# Patient Record
Sex: MALE | Race: WHITE | Employment: FULL TIME | URBAN - METROPOLITAN AREA
[De-identification: names, ages, dates, MRNs, and addresses within clinical notes are randomized per-mention and may not be internally consistent; named-entity substitution may affect disease eponyms.]

---

## 2024-10-31 ENCOUNTER — HOSPITAL ENCOUNTER (INPATIENT)
Age: 69
LOS: 13 days | Discharge: HOME | DRG: 057 | End: 2024-11-13
Attending: PHYSICAL MEDICINE & REHABILITATION | Admitting: PHYSICAL MEDICINE & REHABILITATION
Payer: MEDICARE

## 2024-10-31 PROBLEM — I63.9 ACUTE ISCHEMIC STROKE (HCC): Status: ACTIVE | Noted: 2024-10-31

## 2024-10-31 LAB
GLUCOSE BLD-MCNC: 121 MG/DL (ref 70–99)
PERFORMED ON: ABNORMAL

## 2024-10-31 PROCEDURE — 1280000000 HC REHAB R&B

## 2024-10-31 RX ORDER — ACETAMINOPHEN 325 MG/1
650 TABLET ORAL EVERY 6 HOURS PRN
Status: DISCONTINUED | OUTPATIENT
Start: 2024-10-31 | End: 2024-11-13 | Stop reason: HOSPADM

## 2024-10-31 RX ORDER — BISACODYL 10 MG
10 SUPPOSITORY, RECTAL RECTAL DAILY PRN
Status: DISCONTINUED | OUTPATIENT
Start: 2024-10-31 | End: 2024-11-13 | Stop reason: HOSPADM

## 2024-10-31 RX ORDER — BUDESONIDE AND FORMOTEROL FUMARATE DIHYDRATE 160; 4.5 UG/1; UG/1
2 AEROSOL RESPIRATORY (INHALATION)
Status: DISCONTINUED | OUTPATIENT
Start: 2024-10-31 | End: 2024-11-13 | Stop reason: HOSPADM

## 2024-10-31 RX ORDER — METFORMIN HYDROCHLORIDE 500 MG/1
500 TABLET, EXTENDED RELEASE ORAL
Status: DISCONTINUED | OUTPATIENT
Start: 2024-11-01 | End: 2024-11-13 | Stop reason: HOSPADM

## 2024-10-31 RX ORDER — ASPIRIN 81 MG/1
81 TABLET, CHEWABLE ORAL DAILY
Status: DISCONTINUED | OUTPATIENT
Start: 2024-11-01 | End: 2024-11-13 | Stop reason: HOSPADM

## 2024-10-31 RX ORDER — LANOLIN ALCOHOL/MO/W.PET/CERES
400 CREAM (GRAM) TOPICAL 2 TIMES DAILY
Status: DISCONTINUED | OUTPATIENT
Start: 2024-10-31 | End: 2024-11-13 | Stop reason: HOSPADM

## 2024-10-31 RX ORDER — CLOPIDOGREL BISULFATE 75 MG/1
75 TABLET ORAL DAILY
Status: DISCONTINUED | OUTPATIENT
Start: 2024-11-01 | End: 2024-11-13 | Stop reason: HOSPADM

## 2024-10-31 RX ORDER — ONDANSETRON 4 MG/1
4 TABLET, FILM COATED ORAL EVERY 8 HOURS PRN
Status: DISCONTINUED | OUTPATIENT
Start: 2024-10-31 | End: 2024-11-13 | Stop reason: HOSPADM

## 2024-10-31 RX ORDER — LOSARTAN POTASSIUM AND HYDROCHLOROTHIAZIDE 12.5; 5 MG/1; MG/1
2 TABLET ORAL DAILY
Status: DISCONTINUED | OUTPATIENT
Start: 2024-11-01 | End: 2024-10-31 | Stop reason: CLARIF

## 2024-10-31 RX ORDER — SENNA AND DOCUSATE SODIUM 50; 8.6 MG/1; MG/1
1 TABLET, FILM COATED ORAL 2 TIMES DAILY
Status: DISCONTINUED | OUTPATIENT
Start: 2024-10-31 | End: 2024-11-08

## 2024-10-31 RX ORDER — ATORVASTATIN CALCIUM 10 MG/1
10 TABLET, FILM COATED ORAL DAILY
Status: DISCONTINUED | OUTPATIENT
Start: 2024-11-01 | End: 2024-11-13 | Stop reason: HOSPADM

## 2024-10-31 RX ORDER — GLUCAGON 1 MG/ML
1 KIT INJECTION PRN
Status: DISCONTINUED | OUTPATIENT
Start: 2024-10-31 | End: 2024-11-13 | Stop reason: HOSPADM

## 2024-10-31 RX ORDER — ENOXAPARIN SODIUM 100 MG/ML
40 INJECTION SUBCUTANEOUS DAILY
Status: DISCONTINUED | OUTPATIENT
Start: 2024-11-01 | End: 2024-11-13 | Stop reason: HOSPADM

## 2024-10-31 RX ORDER — DEXTROSE MONOHYDRATE 100 MG/ML
INJECTION, SOLUTION INTRAVENOUS CONTINUOUS PRN
Status: DISCONTINUED | OUTPATIENT
Start: 2024-10-31 | End: 2024-11-13 | Stop reason: HOSPADM

## 2024-10-31 RX ORDER — LANOLIN ALCOHOL/MO/W.PET/CERES
3 CREAM (GRAM) TOPICAL NIGHTLY PRN
Status: DISCONTINUED | OUTPATIENT
Start: 2024-10-31 | End: 2024-11-13 | Stop reason: HOSPADM

## 2024-10-31 RX ORDER — INSULIN LISPRO 100 [IU]/ML
0-4 INJECTION, SOLUTION INTRAVENOUS; SUBCUTANEOUS
Status: DISCONTINUED | OUTPATIENT
Start: 2024-10-31 | End: 2024-11-08

## 2024-10-31 RX ORDER — HYDRALAZINE HYDROCHLORIDE 10 MG/1
10 TABLET, FILM COATED ORAL EVERY 6 HOURS PRN
Status: DISCONTINUED | OUTPATIENT
Start: 2024-10-31 | End: 2024-11-13 | Stop reason: HOSPADM

## 2024-10-31 RX ORDER — PANTOPRAZOLE SODIUM 40 MG/1
40 TABLET, DELAYED RELEASE ORAL
Status: DISCONTINUED | OUTPATIENT
Start: 2024-11-01 | End: 2024-11-13 | Stop reason: HOSPADM

## 2024-10-31 RX ORDER — LOSARTAN POTASSIUM 100 MG/1
100 TABLET ORAL DAILY
Status: DISCONTINUED | OUTPATIENT
Start: 2024-11-01 | End: 2024-11-13 | Stop reason: HOSPADM

## 2024-10-31 RX ORDER — HYDROCHLOROTHIAZIDE 25 MG/1
25 TABLET ORAL DAILY
Status: DISCONTINUED | OUTPATIENT
Start: 2024-11-01 | End: 2024-11-13 | Stop reason: HOSPADM

## 2024-10-31 RX ORDER — ALBUTEROL SULFATE 90 UG/1
2 INHALANT RESPIRATORY (INHALATION) EVERY 6 HOURS PRN
Status: DISCONTINUED | OUTPATIENT
Start: 2024-10-31 | End: 2024-11-01

## 2024-10-31 RX ORDER — POLYETHYLENE GLYCOL 3350 17 G/17G
17 POWDER, FOR SOLUTION ORAL DAILY PRN
Status: DISCONTINUED | OUTPATIENT
Start: 2024-10-31 | End: 2024-11-13 | Stop reason: HOSPADM

## 2024-10-31 ASSESSMENT — LIFESTYLE VARIABLES
HOW OFTEN DO YOU HAVE A DRINK CONTAINING ALCOHOL: 2-3 TIMES A WEEK
HOW MANY STANDARD DRINKS CONTAINING ALCOHOL DO YOU HAVE ON A TYPICAL DAY: 1 OR 2

## 2024-10-31 NOTE — PROGRESS NOTES
External Admission Medical Chart Summary For :  Sean Jackson  (All information taken from discharging hospital medical chart)    HPI:  69 yr old male with PMH of DM, HTN, HLD, GERD, COPD who presented to the ER on 10/28 with left sided weakness. He had woken up that morning and was unable to lift his arm and leg. He is unsure if his speech is slurred.  He is unable to get tPA as he was out of the window. The stroke team was consulted and recommendations were made. He is functioning below his baseline and is interested in coming to ARU to increase his independence prior to discharging home.     Physician A&P at discharge from previous hospital:  ASSESSMENT      Principal Problem:    Acute ischemic stroke (HCC)  Active Problems:    Essential hypertension    Diabetes mellitus, type 2 (HCC)    COPD (chronic obstructive pulmonary disease) (HCC)    Hyperlipidemia    Cigar smoker    Left arm weakness    Left leg weakness           PLAN      Suspected CVA  -NIHSS 5 [drift in LUE &LLE, mild dysarthria, and ataxia in 2 limbs]  -ASA/Plavix, statin  -permissive HTN  -telemetry, Echo  -brain MRI  w small acute infarct in the right thalamus   -PT/OT/ST  -STANSE consult appreciated. Dr. Salazar recommended DAPT with ASA & Plavix for NIHSS 3.   -stop smoking     HTN: permissive HTN     HLD: statin     DMT2: LSSI     Tobacco use: reports motivated to stop     Acute Hypoxic Respiratory Failure  COPD: albuterol prn, recommends PFTs outpatient, contiue BD, wean O2 as able     DVT prophylaxis: Intermediate to High risk on  Lovenox SC     Full Code    Diet at discharge from previous hospital:  Cardiac    Anticoagulation at discharge from previous hospital:  Lovenox    Code Status from previous hospital:  Full

## 2024-11-01 LAB
ANION GAP SERPL CALCULATED.3IONS-SCNC: 10 MMOL/L (ref 3–16)
BASOPHILS # BLD: 0 K/UL (ref 0–0.2)
BASOPHILS NFR BLD: 0.5 %
BUN SERPL-MCNC: 15 MG/DL (ref 7–20)
CALCIUM SERPL-MCNC: 9 MG/DL (ref 8.3–10.6)
CHLORIDE SERPL-SCNC: 96 MMOL/L (ref 99–110)
CO2 SERPL-SCNC: 27 MMOL/L (ref 21–32)
CREAT SERPL-MCNC: 0.7 MG/DL (ref 0.8–1.3)
DEPRECATED RDW RBC AUTO: 14.3 % (ref 12.4–15.4)
EOSINOPHIL # BLD: 0.5 K/UL (ref 0–0.6)
EOSINOPHIL NFR BLD: 7.1 %
GFR SERPLBLD CREATININE-BSD FMLA CKD-EPI: >90 ML/MIN/{1.73_M2}
GLUCOSE BLD-MCNC: 114 MG/DL (ref 70–99)
GLUCOSE BLD-MCNC: 117 MG/DL (ref 70–99)
GLUCOSE BLD-MCNC: 125 MG/DL (ref 70–99)
GLUCOSE BLD-MCNC: 134 MG/DL (ref 70–99)
GLUCOSE SERPL-MCNC: 113 MG/DL (ref 70–99)
HCT VFR BLD AUTO: 44 % (ref 40.5–52.5)
HGB BLD-MCNC: 14.9 G/DL (ref 13.5–17.5)
LYMPHOCYTES # BLD: 1.8 K/UL (ref 1–5.1)
LYMPHOCYTES NFR BLD: 26 %
MCH RBC QN AUTO: 27.1 PG (ref 26–34)
MCHC RBC AUTO-ENTMCNC: 33.8 G/DL (ref 31–36)
MCV RBC AUTO: 80.2 FL (ref 80–100)
MONOCYTES # BLD: 0.9 K/UL (ref 0–1.3)
MONOCYTES NFR BLD: 12.8 %
NEUTROPHILS # BLD: 3.6 K/UL (ref 1.7–7.7)
NEUTROPHILS NFR BLD: 53.6 %
PERFORMED ON: ABNORMAL
PLATELET # BLD AUTO: 214 K/UL (ref 135–450)
PMV BLD AUTO: 8.1 FL (ref 5–10.5)
POTASSIUM SERPL-SCNC: 3.8 MMOL/L (ref 3.5–5.1)
PREALB SERPL-MCNC: 17.2 MG/DL (ref 20–40)
RBC # BLD AUTO: 5.48 M/UL (ref 4.2–5.9)
SODIUM SERPL-SCNC: 133 MMOL/L (ref 136–145)
WBC # BLD AUTO: 6.8 K/UL (ref 4–11)

## 2024-11-01 PROCEDURE — 85025 COMPLETE CBC W/AUTO DIFF WBC: CPT

## 2024-11-01 PROCEDURE — 6360000002 HC RX W HCPCS: Performed by: PHYSICAL MEDICINE & REHABILITATION

## 2024-11-01 PROCEDURE — 36415 COLL VENOUS BLD VENIPUNCTURE: CPT

## 2024-11-01 PROCEDURE — 97530 THERAPEUTIC ACTIVITIES: CPT

## 2024-11-01 PROCEDURE — 92523 SPEECH SOUND LANG COMPREHEN: CPT

## 2024-11-01 PROCEDURE — 97166 OT EVAL MOD COMPLEX 45 MIN: CPT

## 2024-11-01 PROCEDURE — 97530 THERAPEUTIC ACTIVITIES: CPT | Performed by: PHYSICAL THERAPIST

## 2024-11-01 PROCEDURE — 94640 AIRWAY INHALATION TREATMENT: CPT

## 2024-11-01 PROCEDURE — 97162 PT EVAL MOD COMPLEX 30 MIN: CPT | Performed by: PHYSICAL THERAPIST

## 2024-11-01 PROCEDURE — 2500000003 HC RX 250 WO HCPCS: Performed by: PHYSICAL MEDICINE & REHABILITATION

## 2024-11-01 PROCEDURE — 97110 THERAPEUTIC EXERCISES: CPT | Performed by: PHYSICAL THERAPIST

## 2024-11-01 PROCEDURE — 97116 GAIT TRAINING THERAPY: CPT | Performed by: PHYSICAL THERAPIST

## 2024-11-01 PROCEDURE — 6370000000 HC RX 637 (ALT 250 FOR IP): Performed by: PHYSICAL MEDICINE & REHABILITATION

## 2024-11-01 PROCEDURE — 84134 ASSAY OF PREALBUMIN: CPT

## 2024-11-01 PROCEDURE — 94760 N-INVAS EAR/PLS OXIMETRY 1: CPT

## 2024-11-01 PROCEDURE — 1280000000 HC REHAB R&B

## 2024-11-01 PROCEDURE — 80048 BASIC METABOLIC PNL TOTAL CA: CPT

## 2024-11-01 PROCEDURE — 97535 SELF CARE MNGMENT TRAINING: CPT

## 2024-11-01 RX ORDER — FENOFIBRATE 160 MG/1
160 TABLET ORAL DAILY
COMMUNITY
Start: 2024-05-24

## 2024-11-01 RX ORDER — ALBUTEROL SULFATE 90 UG/1
2 INHALANT RESPIRATORY (INHALATION) EVERY 4 HOURS PRN
Status: DISCONTINUED | OUTPATIENT
Start: 2024-11-01 | End: 2024-11-13 | Stop reason: HOSPADM

## 2024-11-01 RX ORDER — ATORVASTATIN CALCIUM 80 MG/1
80 TABLET, FILM COATED ORAL DAILY
COMMUNITY
Start: 2024-10-31

## 2024-11-01 RX ORDER — LOSARTAN POTASSIUM AND HYDROCHLOROTHIAZIDE 25; 100 MG/1; MG/1
1 TABLET ORAL DAILY
COMMUNITY
Start: 2024-05-24

## 2024-11-01 RX ORDER — ALBUTEROL SULFATE 90 UG/1
1 INHALANT RESPIRATORY (INHALATION) EVERY 4 HOURS PRN
COMMUNITY

## 2024-11-01 RX ORDER — METFORMIN HYDROCHLORIDE 500 MG/1
500 TABLET, EXTENDED RELEASE ORAL
COMMUNITY

## 2024-11-01 RX ORDER — CLOPIDOGREL BISULFATE 75 MG/1
75 TABLET ORAL DAILY
Status: ON HOLD | COMMUNITY
Start: 2024-11-01 | End: 2024-11-12

## 2024-11-01 RX ORDER — ASPIRIN 81 MG/1
81 TABLET ORAL DAILY
COMMUNITY
Start: 2024-10-30

## 2024-11-01 RX ADMIN — PANTOPRAZOLE SODIUM 40 MG: 40 TABLET, DELAYED RELEASE ORAL at 06:52

## 2024-11-01 RX ADMIN — LOSARTAN POTASSIUM 100 MG: 100 TABLET, FILM COATED ORAL at 08:18

## 2024-11-01 RX ADMIN — ANTI-FUNGAL POWDER MICONAZOLE NITRATE TALC FREE: 1.42 POWDER TOPICAL at 14:47

## 2024-11-01 RX ADMIN — CLOPIDOGREL BISULFATE 75 MG: 75 TABLET ORAL at 08:18

## 2024-11-01 RX ADMIN — ENOXAPARIN SODIUM 40 MG: 100 INJECTION SUBCUTANEOUS at 08:16

## 2024-11-01 RX ADMIN — BUDESONIDE AND FORMOTEROL FUMARATE DIHYDRATE 2 PUFF: 160; 4.5 AEROSOL RESPIRATORY (INHALATION) at 09:03

## 2024-11-01 RX ADMIN — ALBUTEROL SULFATE 2 PUFF: 90 AEROSOL, METERED RESPIRATORY (INHALATION) at 20:18

## 2024-11-01 RX ADMIN — ATORVASTATIN CALCIUM 10 MG: 10 TABLET, FILM COATED ORAL at 08:18

## 2024-11-01 RX ADMIN — ANTI-FUNGAL POWDER MICONAZOLE NITRATE TALC FREE: 1.42 POWDER TOPICAL at 21:41

## 2024-11-01 RX ADMIN — HYDROCHLOROTHIAZIDE 25 MG: 25 TABLET ORAL at 08:18

## 2024-11-01 RX ADMIN — Medication 400 MG: at 08:18

## 2024-11-01 RX ADMIN — ASPIRIN 81 MG 81 MG: 81 TABLET ORAL at 08:18

## 2024-11-01 RX ADMIN — METFORMIN HYDROCHLORIDE 500 MG: 500 TABLET, EXTENDED RELEASE ORAL at 08:18

## 2024-11-01 RX ADMIN — BUDESONIDE AND FORMOTEROL FUMARATE DIHYDRATE 2 PUFF: 160; 4.5 AEROSOL RESPIRATORY (INHALATION) at 20:17

## 2024-11-01 RX ADMIN — Medication 400 MG: at 21:41

## 2024-11-01 ASSESSMENT — PAIN SCALES - GENERAL
PAINLEVEL_OUTOF10: 0

## 2024-11-01 NOTE — H&P
to  physical, occupational, respiratory, and speech, nutritional services, wound care,   and prosthetics and orthotics. Given the patients complex condition  and risk of further medical complications, rehabilitation services cannot be  safely provided at a lower level of care such as a skilled nursing facility.  I have compared the patients medical and functional status at the time of the  preadmission screening and the same on this date, and there are no significant   changes except as documented below in the assessment and plan.    By signing this document, I acknowledge that I have personally performed a  full physical examination on this patient within 24 hours of admission to  this inpatient rehabilitation facility and have determined the patient to be  able to tolerate the above course of treatment at an intensive level for a  reasonable period of time. I will be completing a detailed individualized  Plan of Care for this patient by day four of the patients stay based upon the  Preadmission Screen, this Post-Admission Evaluation, and the therapy  evaluations.       Rehabilitation Diagnosis:   IGC: Right thalamic infarction      Assessment and Plan:    Right thalamic infarction- ASA, Plavix    HTN  -Cozaar, HCTZ    Diabetes type 2 - SSI, metformin    GERD - Protonix    COPD, smoker- Symbicort, albuterol    HLD - Lipitor    CODE: Full Code  Diet: ADULT DIET; Regular; Low Fat/Low Chol/High Fiber/ALVARO  Bowels: Per protocol  Bladder: Per protocol   Pain: Tylenol  DVT PPx: Lovenox  ELOS: TBD      Celso Martinez MD 11/1/2024, 7:34 AM     * This document was created using dictation software.  While all precautions were taken to ensure accuracy, errors may have occurred.  Please disregard any typographical errors.

## 2024-11-01 NOTE — PROGRESS NOTES
Patient admitted to rehab with CVA.  A/Ox4, insight can vary related to limitations, reported not going to call ambulance after having signs of stroke, reviewed importance of calling 911. Transfers with walker and cues. On regular, low fat diet, reviewed diabetic needs, tolerating well. Medications taken whole. On Lovenox for DVT prophylaxis.  Skin: kathryn area excoriated, reports psoriasis for years, but does not treat, skin tear gluteal slit. Has been continent of bladder. LBM 10/31/2024. Chair/bed alarms in use and call light in reach. Will monitor for safety.

## 2024-11-01 NOTE — PROGRESS NOTES
Resting quietly in bed, respirations even/easy. Patient up most of the night, falling asleep around 330am. Patient admitted with a CVA, left sided weakness. Transferred with a stedy x1 last evening. Patient with some impulsive movements and poor safety awareness at times. Lungs diminished with some expiratory wheezes. HR regular. Abdomen non tender with active bowel sounds. Has been continent of urine so far this shift. Unsure of last BM date. Patient with c/o LLE spasms, denied need for tylenol but requesting compression boots for comfort. Spoke with patient about possible need for muscle relaxant, will leave note for MD. Encouraged to call for all needs, call light remains in reach at all times. Bed alarm active. Marcela Mcmillan RN

## 2024-11-01 NOTE — PLAN OF CARE
ARU PATIENT TREATMENT PLAN  [facility]   [address] [place]  [address]  [phone]    Sean Jackson    : 1955  [facility] #: [id number]  MRN: [id number]   PHYSICIAN:  Beryl Greenberg MD  Primary Problem    Patient Active Problem List   Diagnosis    Acute ischemic stroke (HCC)       Rehabilitation Diagnosis:     Acute ischemic stroke (HCC) [I63.9]       ADMIT DATE:10/31/2024    Patient Goals: to return home    Admitting Impairments: Right thalamic infarction- ASA, Plavix     HTN  -Cozaar, HCTZ     Diabetes type 2 - SSI, metformin     GERD - Protonix     COPD, smoker- Symbicort, albuterol     HLD - Lipitor  Barriers: Left hemiparesis, ataxia   Participation: good     CARE PLAN     NURSING:  Sean Jackson while on this unit will:     [] Be continent of bowel and bladder     [x] Have an adequate number of bowel movements  [x] Urinate with no urinary retention >300ml in bladder  [] Complete bladder protocol with mccarthy removal  [x] Maintain O2 SATs at 92%  [x] Have pain managed while on ARU       [] Be pain free by discharge   [x] Have no skin breakdown while on ARU  [x] Have improved skin integrity via wound measurements  [x] Have no signs/symptoms of infection at the wound site  [x] Be free from injury during hospitalization   [x] Complete education with patient/family with understanding demonstrated for:Acute Ischemic Stroke  [x] Adjustment   [x] Other:HTN, DM2   Nursing interventions may include bowel/bladder training, education for medical assistive devices, medication education, O2 saturation management, energy conservation, stress management techniques, fall prevention, alarms protocol, seating and positioning, skin/wound care, pressure relief instruction,dressing changes,  infection protection, DVT prophylaxis, and/or assistance with in room safety with transfers to bed, toilet, wheelchair, shower as well as bathroom activities and hygiene.

## 2024-11-01 NOTE — PROGRESS NOTES
Patient arrived shortly before 2200, no report called. Evening nurse RN attempted to call [facility] for report, sat on hold for 10 minutes. Attempted call again after 15 minutes and nurse states she was unable to get into the patients discharge chart to give thorough report. LETITIA used for information on transferring and previous orders. Marcela Mcmillan, RN

## 2024-11-01 NOTE — PLAN OF CARE
Problem: Skin/Tissue Integrity  Goal: Absence of new skin breakdown  Description: 1.  Monitor for areas of redness and/or skin breakdown  2.  Assess vascular access sites hourly  3.  Every 4-6 hours minimum:  Change oxygen saturation probe site  4.  Every 4-6 hours:  If on nasal continuous positive airway pressure, respiratory therapy assess nares and determine need for appliance change or resting period.  Outcome: Progressing  Note: Able to change positions in bed without assist, no evidence of skin breakdown noted. Waffle cushion in place to chair.      Problem: Safety - Adult  Goal: Free from fall injury  Outcome: Progressing  Flowsheets (Taken 11/1/2024 0259)  Free From Fall Injury:   Instruct family/caregiver on patient safety   Based on caregiver fall risk screen, instruct family/caregiver to ask for assistance with transferring infant if caregiver noted to have fall risk factors  Note: Pt educated on falls precautions and safety. Call light and personal belongings within reach at all times. Non skid socks in use when up. Hourly rounding and alarms active.

## 2024-11-01 NOTE — PROGRESS NOTES
Comprehensive Nutrition Assessment    Type and Reason for Visit:  Consult    Nutrition Recommendations/Plan:   Continue current diet.     Malnutrition Assessment:  Malnutrition Status:  No malnutrition (11/01/24 0707)    Context:  Acute Illness       Nutrition Assessment:    RD consult for IP rehab. Pt. admitted for acute ischemic stroke. Currently receiving a cardiac diet. Diet acceptance is okay. Pt. reports good tolerance to the foods. No recent Hx. of weight loss or appetite changes. He reports normal bowel function. No questions or concerns at the time of visit. No new recommendations. Will continue to monitor nutritional adequacy and diet acceptance.    Nutrition Related Findings:    Na 133, Cr 0.7. LBM 10/31.         Current Nutrition Intake & Therapies:    Average Meal Intake: Unable to assess  Average Supplements Intake: None Ordered  ADULT DIET; Regular; Low Fat/Low Chol/High Fiber/ALVARO    Anthropometric Measures:  Height: 167.6 cm (5' 5.98\")  Ideal Body Weight (IBW): 142 lbs (65 kg)       Current Body Weight: 86.2 kg (190 lb 0.6 oz), 133.8 % IBW.    Current BMI (kg/m2): 30.7                             BMI Categories: Obese Class 1 (BMI 30.0-34.9)    Estimated Daily Nutrient Needs:  Energy Requirements Based On: Kcal/kg  Weight Used for Energy Requirements: Current  Energy (kcal/day): 6997-4503 kcal (18-23 kcal/kg)  Weight Used for Protein Requirements: Current  Protein (g/day):  g (1-1.2g/kg)  Method Used for Fluid Requirements: 1 ml/kcal  Fluid (ml/day): Or per provider    Nutrition Diagnosis:   Increased nutrient needs related to increase demand for energy/nutrients as evidenced by rehab for strength and conditioning    Nutrition Interventions:   Food and/or Nutrient Delivery: Continue Current Diet  Nutrition Education/Counseling:  (Monitor need)  Coordination of Nutrition Care: Continue to monitor while inpatient       Goals:  Goals: PO intake 50% or greater  Type of Goal: New goal  Previous Goal

## 2024-11-01 NOTE — PLAN OF CARE
Problem: Discharge Planning  Goal: Discharge to home or other facility with appropriate resources  11/1/2024 1023 by Magnolia Adhikari, RN  Outcome: Progressing  Flowsheets (Taken 11/1/2024 0811)  Discharge to home or other facility with appropriate resources:   Identify barriers to discharge with patient and caregiver   Arrange for needed discharge resources and transportation as appropriate   Identify discharge learning needs (meds, wound care, etc)   Refer to discharge planning if patient needs post-hospital services based on physician order or complex needs related to functional status, cognitive ability or social support system  11/1/2024 0259 by Marcela Mcmillan, RN  Outcome: Progressing     Problem: Chronic Conditions and Co-morbidities  Goal: Patient's chronic conditions and co-morbidity symptoms are monitored and maintained or improved  11/1/2024 1023 by Magnolia Adhikari, RN  Outcome: Progressing  Flowsheets (Taken 11/1/2024 0811)  Care Plan - Patient's Chronic Conditions and Co-Morbidity Symptoms are Monitored and Maintained or Improved:   Monitor and assess patient's chronic conditions and comorbid symptoms for stability, deterioration, or improvement   Collaborate with multidisciplinary team to address chronic and comorbid conditions and prevent exacerbation or deterioration   Update acute care plan with appropriate goals if chronic or comorbid symptoms are exacerbated and prevent overall improvement and discharge  11/1/2024 0259 by Marcela Mcmillan, RN  Outcome: Progressing     Problem: Skin/Tissue Integrity  Goal: Absence of new skin breakdown  Description: 1.  Monitor for areas of redness and/or skin breakdown  2.  Assess vascular access sites hourly  3.  Every 4-6 hours minimum:  Change oxygen saturation probe site  4.  Every 4-6 hours:  If on nasal continuous positive airway pressure, respiratory therapy assess nares and determine need for appliance change or resting period.  11/1/2024  on assessed needs   Provide specific nutrition education to patient or family as appropriate     Problem: ABCDS Injury Assessment  Goal: Absence of physical injury  Outcome: Progressing

## 2024-11-01 NOTE — PROGRESS NOTES
Ethnicity  \"Are you of , /a, or Qatari origin?\"  Check all that apply:  [x] A.  No, not of , /a, or Qatari Origin  [] B.  Yes, Polish, Polish American, Chicano/a  [] C.  Yes, British  [] D.  Yes, Brennan  [] E.  Yes, another , , or Qatari origin  [] X.  Patient unable to respond    Race  \"What is your race?\"  Check all that apply:  [x] A.  White  [] B.  Black or   [] C.   or   [] D.     [] E.  Chinese  [] F.  Mexican  [] G. Omani  [] H.  Tajik  [] I.  Spanish  [] J.  Other   [] K.    [] L.  Welsh or Kevin  [] M.  Micronesian  [] N.  Other   [] X.  Patient unable to respond    High Risk Drug Classes:  Use and Indication    Is taking: Check if the pt is taking any medications by pharmacological classification, not how it is used, in the following classes  Indication noted: If column 1 is checked, check if there is an indication noted for all meds in the drug class Is taking  (check all that apply) Indication noted (check all that apply)   Antipsychotic [] []   Anticoagulant [] []   Antibiotic [] []   Opioid [] []   Antiplatelet [x] [x]   Hypoglycemic (including insulin) [] []   None of the above []     Special Treatments, Procedures, and Programs    Check all of the following treatments, procedures, and programs that apply on admission. On admission (check all that apply)   Cancer Treatments   A1. Chemotherapy []           A2. IV []           A3. Oral []           A10. Other []   B1. Radiation []   Respiratory Therapies   C1. Oxygen Therapy []           C2. Continuous []           C3. Intermittent []           C4. High-concentration []   D1. Suctioning []           D2. Scheduled []           D3. As needed []   E1. Tracheostomy Care []   F1. Invasive Mechanical Ventilator (ventilator or respirator) []   G1. Non-invasive Mechanical Ventilator []           G2. BiPAP []            G3. CPAP []   Other   H1. IV Medications []           H2. Vasoactive medications []           H3. Antibiotics []           H4. Anticoagulation []           H10. Other []   I1. Transfusions []   J1. Dialysis []           J2. Hemodialysis []           J3. Peritoneal dialysis []   O1. IV access []           O2. Peripheral []           O3. Midline []           O4. Central (PICC, tunneled, port) []      None of the above (select if no Cancer, Respiratory, or Other boxes are checked) [x]

## 2024-11-01 NOTE — PROGRESS NOTES
4 Eyes Skin Assessment     NAME:  Sean Jackson  YOB: 1955  MEDICAL RECORD NUMBER:  [id number]    The patient is being assessed for  Admission    I agree that at least one RN has performed a thorough Head to Toe Skin Assessment on the patient. ALL assessment sites listed below have been assessed.      Areas assessed by both nurses:    Head, Face, Ears, Shoulders, Back, Chest, Arms, Elbows, Hands, Sacrum. Buttock, Coccyx, Ischium, Legs. Feet and Heels, and Under Medical Devices         Does the Patient have a Wound? No noted wound(s)       David Prevention initiated by RN: Yes  Wound Care Orders initiated by RN: No    Pressure Injury (Stage 3,4, Unstageable, DTI, NWPT, and Complex wounds) if present, place Wound referral order by RN under : No    New Ostomies, if present place, Ostomy referral order under : No     Nurse 1 eSignature: Electronically signed by Marcela Mcmillan RN on 10/31/24 at 11:45 PM EDT    **SHARE this note so that the co-signing nurse can place an eSignature**  11/01/2024 0630  Nurse 2 eSignature: Electronically signed by Magnolia Adhikari RN on

## 2024-11-01 NOTE — PROGRESS NOTES
Facility/Department: [address] IP REHAB  Initial Speech/Language/Cognitive Assessment    NAME: Sean Jackson  : 1955   MRN: [id number]  ADMISSION DATE: 10/31/2024  ADMITTING DIAGNOSIS: has Acute ischemic stroke (HCC) on their problem list.   has a past medical history of Asthma, GERD (gastroesophageal reflux disease), Hyperlipidemia, and Hypertension.   has a past surgical history that includes eye muscle surgery; Wrist surgery; and Appendectomy.  Allergies: Horse- derived products    DATE ONSET:     Date of Eval: 2024   Evaluating Therapist: ROMELIA Benjamin      Chart Reviewed:  Chief Complaint: Right thalamic infarction   History of Present Illness/Hospital Course:  Patient is a 69-year-old male who developed a sudden onset of left arm and leg weakness, ataxia, and dysarthria.  Patient was brought to the hospital where MRI scan of the brain revealed a small acute infarction of the right thalamus.  Patient known to have history of hypertension, diabetes type 2, COPD, and increased lipids.  Patient seen by neurology and started on aspirin and Plavix.  Patient started in therapies, and needs min assist for transfers and gait of 40 feet due to his weakness and ataxia.  Patient needs min to mod assist for lower body bathing and dressing activities.  Patient lives with a roommate in a mobile home and was independent prior to admission.  He states his son and daughter-in-law lives next-door.    RECENT RESULTS  MRI Hed: 10/29/2024  Impression  Small acute infarct in the right thalamus/thalamocapsular region.  No evidence of hemorrhagic transformation or significant mass effect.    MRI Angio of Neck: 10/29/2024:  IMPRESSION   No large vessel occlusion.     Primary Complaint:   Pt was seen at other facility with recommendation for home slp 1-3 times a  week for cognitive -linguistic therapy  SLP signed off of dysphagia tx at other facility-pt on regular consistency diet  Pt reports denies any status  developing goals and treatment plan: yes    Subjective:   Previous level of function and limitations: History per pt self report and chart review  Social/Functional History  Lives With:  (roommate)  Type of Home: Mobile home  Home Layout: One level  Home Equipment: Walker - Rolling  Has the patient had two or more falls in the past year or any fall with injury in the past year?: No  ADL Assistance: Independent  Homemaking Assistance: Independent (dtr assists with banking.)  Ambulation Assistance: Independent (no device)  Transfer Assistance: Independent  Active : Yes (has not driven recently d/t vehicle still in \"probate\")  Mode of Transportation: [employer]  Education: quite school in 9th grade got in a lot of fights at [employer]  Occupation: Retired  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; 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  Leisure & Hobbies: Like to fish and frisbee golf , ride motorcycles  Additional Comments: 2 dtrs and 2 sons, all live \"pretty close\"          Objective:  Assessment included OMSME; Cognitive-Linguistic Exam  Pain: denied  Vision  Vision: Impaired  Vision Exceptions: Wears glasses for reading (denies acute visual changes)  Hearing  Hearing: Within functional limits     Oral Motor   Labial:  (fairly symmetrical movements ; very slight left asym)  Dentition:  (many missing dentition; poor dentition)  Lingual:  (volitional midline protrusion/elevation with good lateralization)  Velum:  ( fairly symmetrical during phonation of /a/)  Gag:  (present but diminished)    Motor Speech  Intelligibility:  (Audible /intelligible connected speech. Pt self reports no status changes. Pt reports tobacco use; and recently stopped smoking a pipe-body still craves it .)    Auditory Comprehension  Comprehension: Within Functional Limits  Basic Questions: WFL  One Step Commands: WFL  Two Step Commands: WFL  Multistep Commands: WFL (3 step)  Complex/Abstract Commands: WFL (2 step sequential and spatial commands)  L/R

## 2024-11-01 NOTE — PROGRESS NOTES
Physical Therapy  Facility/Department: [address] REHAB  Rehabilitation Physical Therapy Initial Assessment    NAME: Sean Jackson  : 1955 (69 y.o.)  MRN: [id number]  CODE STATUS: Full Code    Date of Service: 24      Past Medical History:   Diagnosis Date    Asthma     GERD (gastroesophageal reflux disease)     Hyperlipidemia     Hypertension      Past Surgical History:   Procedure Laterality Date    APPENDECTOMY      EYE MUSCLE SURGERY      WRIST SURGERY      TENDON REPAIR RIGHT       Chart Reviewed: Yes  Patient assessed for rehabilitation services?: Yes  Additional Pertinent Hx: Per H&P: Patient is a 69-year-old male who developed a sudden onset of left arm and leg weakness, ataxia, and dysarthria.  Patient was brought to the hospital where MRI scan of the brain revealed a small acute infarction of the right thalamus.  Patient known to have history of hypertension, diabetes type 2, COPD, and increased lipids.  Patient seen by neurology and started on aspirin and Plavix.  Patient started in therapies, and needs min assist for transfers and gait of 40 feet due to his weakness and ataxia.  Patient needs min to mod assist for lower body bathing and dressing activities.  Patient lives with a roommate in a mobile home and was independent prior to admission.  He states his son and daughter-in-law lives next-door.  Family / Caregiver Present: No  Referring Practitioner: Dr. Greenberg  Referral Date : 10/31/24  Diagnosis: Acute Ischemic Stroke    Restrictions:        SUBJECTIVE  Subjective: Pt is doing well and is agreeable to PT session. He does not report any pain.         Prior Level of Function:  Social/Functional History  Lives With:  (roommate)  Type of Home: Mobile home  Home Layout: One level  Home Access: Stairs to enter with rails  Entrance Stairs - Number of Steps: 2  Entrance Stairs - Rails: None  Bathroom Shower/Tub: Tub/Shower unit  Bathroom Toilet: Standard  Bathroom Equipment: Grab bars in  mod assist for lower body bathing and dressing activities.  Patient lives with a roommate in a mobile home and was independent prior to admission.  He states his son and daughter-in-law lives next-door. Pt currently lives in a one level mobile home with a roomate. He has 1 NANCY without railings. Pt has a tub/shower unit with grab bars in shower. Pt currently performs all bed mobility with SBA, however requires increased time and effort to complete. Pt can perform sit <> stand transfers and car transfers with CGA and RW. Pt currently able to ascned/descend curb and 4 steps with CGA x2 persons. Pt amb 32' x2, 93', 25' x1 w/ RW and CGA/min A. Coordination tests showed LUE and LLE have decreased coordination compared to the R. Pt demonstrates impulsivity with all movements and decreased safety awareness. Pt would benefit from skilled PT to improve strength, coordination, balance, activity tolerance, and functional mobility in order to return home.  Treatment Diagnosis: Impaired functional mobility  Therapy Prognosis: Good  Decision Making: Medium Complexity  Exam: Pt currently performs all bed mobility with SBA, however requires increased time and effort to complete. Pt can perform sit <> stand transfers and car transfers with CGA and RW. Pt currently able to ascned/descend curb and 4 steps with CGA x2 persons. Pt amb 32' x2, 93', 25' x1 w/ RW and CGA/min A. Coordination tests showed LUE and LLE have decreased coordination compared to the R. Pt demonstrates impulsivity with all movements and decreased safety awareness. Pt would benefit from skilled PT to improve strength, coordination, balance, activity tolerance, and functional mobility in order to return home.  Clinical Presentation: Evolving  Barriers to Learning: vision (readers)  Treatment Initiated : 11/1/2024  Discharge Recommendations: Continue to assess pending progress;Patient would benefit from continued therapy after discharge  PT Equipment

## 2024-11-01 NOTE — CARE COORDINATION
Chart Reviewed.  Met with pt, daughter and granddgtr to introduce  role, initiate discussion regarding DC planning and to inform of weekly Team conferences on Tuesdays.                                 SOCIAL WORK ASSESSMENT      GOAL:    His goal is to return home (and is counting the days for his discharge)      HOME SITUATION:  Pt reports he lives with a friend, mobile home with two steps entry and one floor living.  His neighbors are his son, DIL and grandchildren.   He does have a dog in the house that is the friend's.  He is retired.  He enjoys watching TV and going fishing.  He is able to drive but currently his car is in probate and unable to get it.          Pcp:   Dr Cobos;  last visit was 5/2024    Pharmacy:   [facility] in [place], [place]        PRIOR LEVEL OF FUNCTIONING:       PERSONAL CARE:   independent                                                                        DRIVES:  legally able to drive but no car available at this time.                                                                      FINANCES:  ind                                                                     MEALS:     ind                                                                                              GROCERY SHOPS: family takes him       DME CURRENTLY AT HOME:Tub /Shower, two bars in the unit, no set, but does have a HH SHower mount.   Family reports they have access to a shower chair and wh walkers if needed.       CURRENT HOME CARE/SERVICES:  none currently. Informed them of possible post acute services where he may need continued services from home care or out patient services to continue his progress.  He is concerned about the dog in the home which he reports would bite, family reports he can put the dog up when they visit or they will take him to out patient.       PREFERRED HOME CARE:  to be determined.       TEAM CONFERENCE DAY:  Tuesdays. Informed them of weekly Team Conferences where Team will

## 2024-11-01 NOTE — PROGRESS NOTES
Occupational Therapy  Facility/Department: [address] [facility]  Rehabilitation Occupational Therapy Evaluation       Date: 24  Patient Name: Sean Jackson       Room: K8B-8189/3270-01  MRN: [id number]  Account: [id number]   : 1955  (69 y.o.) Gender: male     Referring Practitioner: Beryl Greenberg MD  Diagnosis: acute ischemic stroke  Additional Pertinent Hx: Per H&P \"Patient is a 69-year-old male who developed a sudden onset of left arm and leg weakness, ataxia, and dysarthria.  Patient was brought to the hospital where MRI scan of the brain revealed a small acute infarction of the right thalamus.  Patient known to have history of hypertension, diabetes type 2, COPD, and increased lipids.  Patient seen by neurology and started on aspirin and Plavix.  Patient started in therapies, and needs min assist for transfers and gait of 40 feet due to his weakness and ataxia.  Patient needs min to mod assist for lower body bathing and dressing activities.  Patient lives with a roommate in a mobile home and was independent prior to admission.  He states his son and daughter-in-law lives next-door.\"    Restrictions  Restrictions/Precautions: Fall Risk  Other position/activity restrictions: JANI    Subjective  Subjective: Pt met b/s, reported no pain, agreeable to OT eval/tx  Comments: Per RN, OK to see        Objective     Cognition  Overall Cognitive Status: Exceptions  Safety Judgement: Decreased awareness of need for assistance;Decreased awareness of need for safety  Orientation  Overall Orientation Status: Within Functional Limits       ROM  LUE AROM (degrees)  LUE AROM : WFL  RUE AROM (degrees)  RUE AROM : WFL  LUE Strength  L Hand General: 4-/5  RUE Strength  R Hand General: 4/5    Functional Mobility  Balance  Sitting Balance: Stand by assistance  Standing Balance: Contact guard assistance  Transfers  Sit to stand: Contact guard assistance  Stand to sit: Contact guard assistance  Transfer Comments:  to/from RW w/ cues for safe hand placement, impulsive, will require cont edu for safety  Toilet Transfers  Toilet - Technique: Ambulating (RW)  Equipment Used: Standard toilet  Toilet Transfer: Contact guard assistance  Toilet Transfers Comments: completed for assessment  Shower Transfers  Shower - Transfer From: Walker (RW)  Shower - Transfer Type: To and From  Shower - Transfer To: Shower seat with back  Shower - Technique: Ambulating  Shower Transfers: Contact Guard;Minimal assistance  Shower Transfers Comments: cues for safe hand placement for safety  Social/Functional History  Lives With:  (roommate)  Type of Home: House  Home Layout: One level  Home Access: Stairs to enter with rails  Entrance Stairs - Number of Steps: 1  Bathroom Shower/Tub: Walk-in shower  Bathroom Toilet: Standard  Bathroom Equipment: None  Bathroom Accessibility: Walker accessible  Home Equipment: Walker - Rolling  ADL Assistance: Independent  Homemaking Assistance: Independent  Ambulation Assistance: Independent (no device)  Transfer Assistance: Independent  Active : Yes (has not driven recently d/t vehicle still in \"probate\")  Occupation: Retired  Additional Comments: 2 dtrs and 2 sons, all live \"pretty close\"  ADL  Feeding: Independent  Feeding Skilled Clinical Factors: pt able to manage all containers and feed himself while seated in recliner  Grooming: Contact guard assistance  Grooming Skilled Clinical Factors: pt completed oral and hair care while in stance at the sink w/ CGA for standing balance  UE Bathing: Stand by assistance  UE Bathing Skilled Clinical Factors: pt completed UB bathing while seated on shower chair, cues for thoroughness, able to manage HHS  LE Bathing: Contact guard assistance  LE Bathing Skilled Clinical Factors: pt bathed BLE while seated, able to crosss BLE while seated (increased difficulty w/ LLE), standing assistance for posterior perihygiene in stance  UE Dressing: Setup  UE Dressing Skilled

## 2024-11-01 NOTE — PROGRESS NOTES
A complete drug regimen review was completed for this patient.     []  No clinically significant medication issue was identified    [x]   Yes, a clinically significant medication issue was identified     []  Adverse Drug Event:      []  Allergy:      []  Side Effect:      []  Ineffective Therapy:      []  Drug Interaction:     []  Duplicated Therapy:     []  Untreated Indication:      []  Non-adherence:     []  Other:      Nursing/Pharmacy contacted the physician:   Date:    Time:      Actions recommended by physician were completed:  Date :  Time:     Electronically signed by Marcela Mcmillan RN on 11/1/24 at 6:08 AM EDT

## 2024-11-02 LAB
GLUCOSE BLD-MCNC: 114 MG/DL (ref 70–99)
GLUCOSE BLD-MCNC: 117 MG/DL (ref 70–99)
GLUCOSE BLD-MCNC: 119 MG/DL (ref 70–99)
GLUCOSE BLD-MCNC: 170 MG/DL (ref 70–99)
PERFORMED ON: ABNORMAL

## 2024-11-02 PROCEDURE — 6370000000 HC RX 637 (ALT 250 FOR IP): Performed by: PHYSICAL MEDICINE & REHABILITATION

## 2024-11-02 PROCEDURE — 97110 THERAPEUTIC EXERCISES: CPT

## 2024-11-02 PROCEDURE — 97116 GAIT TRAINING THERAPY: CPT

## 2024-11-02 PROCEDURE — 97130 THER IVNTJ EA ADDL 15 MIN: CPT

## 2024-11-02 PROCEDURE — 6360000002 HC RX W HCPCS: Performed by: PHYSICAL MEDICINE & REHABILITATION

## 2024-11-02 PROCEDURE — 97129 THER IVNTJ 1ST 15 MIN: CPT

## 2024-11-02 PROCEDURE — 94640 AIRWAY INHALATION TREATMENT: CPT

## 2024-11-02 PROCEDURE — 1280000000 HC REHAB R&B

## 2024-11-02 PROCEDURE — 97535 SELF CARE MNGMENT TRAINING: CPT

## 2024-11-02 PROCEDURE — 97530 THERAPEUTIC ACTIVITIES: CPT

## 2024-11-02 PROCEDURE — 94760 N-INVAS EAR/PLS OXIMETRY 1: CPT

## 2024-11-02 RX ADMIN — ANTI-FUNGAL POWDER MICONAZOLE NITRATE TALC FREE: 1.42 POWDER TOPICAL at 09:38

## 2024-11-02 RX ADMIN — Medication 400 MG: at 21:48

## 2024-11-02 RX ADMIN — ALBUTEROL SULFATE 2 PUFF: 90 AEROSOL, METERED RESPIRATORY (INHALATION) at 20:07

## 2024-11-02 RX ADMIN — BUDESONIDE AND FORMOTEROL FUMARATE DIHYDRATE 2 PUFF: 160; 4.5 AEROSOL RESPIRATORY (INHALATION) at 20:07

## 2024-11-02 RX ADMIN — CLOPIDOGREL BISULFATE 75 MG: 75 TABLET ORAL at 09:32

## 2024-11-02 RX ADMIN — ENOXAPARIN SODIUM 40 MG: 100 INJECTION SUBCUTANEOUS at 09:35

## 2024-11-02 RX ADMIN — ASPIRIN 81 MG 81 MG: 81 TABLET ORAL at 09:32

## 2024-11-02 RX ADMIN — Medication 400 MG: at 09:33

## 2024-11-02 RX ADMIN — BUDESONIDE AND FORMOTEROL FUMARATE DIHYDRATE 2 PUFF: 160; 4.5 AEROSOL RESPIRATORY (INHALATION) at 07:33

## 2024-11-02 RX ADMIN — HYDROCHLOROTHIAZIDE 25 MG: 25 TABLET ORAL at 09:32

## 2024-11-02 RX ADMIN — METFORMIN HYDROCHLORIDE 500 MG: 500 TABLET, EXTENDED RELEASE ORAL at 09:32

## 2024-11-02 RX ADMIN — LOSARTAN POTASSIUM 100 MG: 100 TABLET, FILM COATED ORAL at 09:32

## 2024-11-02 RX ADMIN — PANTOPRAZOLE SODIUM 40 MG: 40 TABLET, DELAYED RELEASE ORAL at 05:54

## 2024-11-02 RX ADMIN — ATORVASTATIN CALCIUM 10 MG: 10 TABLET, FILM COATED ORAL at 09:32

## 2024-11-02 ASSESSMENT — PAIN SCALES - GENERAL
PAINLEVEL_OUTOF10: 0
PAINLEVEL_OUTOF10: 0

## 2024-11-02 NOTE — PLAN OF CARE
Problem: Skin/Tissue Integrity  Goal: Absence of new skin breakdown  Description: 1.  Monitor for areas of redness and/or skin breakdown  2.  Assess vascular access sites hourly  3.  Every 4-6 hours minimum:  Change oxygen saturation probe site  4.  Every 4-6 hours:  If on nasal continuous positive airway pressure, respiratory therapy assess nares and determine need for appliance change or resting period.  Outcome: Progressing     Problem: Safety - Adult  Goal: Free from fall injury  Outcome: Progressing     Problem: Pain  Goal: Verbalizes/displays adequate comfort level or baseline comfort level  Outcome: Progressing     Problem: ABCDS Injury Assessment  Goal: Absence of physical injury  Outcome: Progressing  Flowsheets (Taken 11/2/2024 5334)  Absence of Physical Injury: Implement safety measures based on patient assessment

## 2024-11-02 NOTE — PROGRESS NOTES
Sean Jackson  11/2/2024  [id number]    Chief Complaint: Acute ischemic stroke (HCC)    Subjective    Patient seen this AM.  Patient working in therapies to improve his left-sided strength and coordination.  Repeat labs yesterday look good and are stable.  His blood sugars are under control.  Patient needs contact-guard assist for transfers and ambulation with a rolling walker for up to 90 feet.        Objective    Patient Vitals for the past 24 hrs:   BP Temp Temp src Pulse Resp SpO2 Height Weight   11/02/24 0733 -- -- -- 82 19 93 % -- --   11/02/24 0516 -- -- -- -- -- -- -- 87.3 kg (192 lb 7.4 oz)   11/01/24 2017 -- -- -- 95 16 96 % -- --   11/01/24 1909 (!) 133/90 98 °F (36.7 °C) Oral 85 16 92 % -- --   11/01/24 0905 -- -- -- -- -- -- 1.676 m (5' 5.98\") --   11/01/24 0903 -- -- -- -- -- 92 % -- --     Gen: No distress, pleasant.   HEENT: Normocephalic, atraumatic.   CV: Regular rate and rhythm. Extremities warm, well perfused.   Resp: No respiratory distress. CTAB   Abd: Soft, nontender   Ext: No edema.   Neuro: Alert, oriented, appropriately interactive.  Left hemiparesis noted.    Laboratory data: Available via EMR.     Therapy progress:       PT    Supine to Sit: Supervision or touching assistance  Sit to Supine: Supervision or touching assistance   Sit to Stand: Supervision or touching assistance  Chair/Bed to Chair Transfer: Supervision or touching assistance  Car Transfer: Supervision or touching assistance  Ambulation 10 ft: Partial/moderate assistance  Ambulation 50 ft: Partial/moderate assistance  Ambulation 150 ft:    Stairs - 1 Step: Dependent  Stairs - 4 Step: Dependent  Stairs - 12 Step:      OT    Eating: Independent  Oral Hygiene: Supervision or touching assistance  Bathing: Supervision or touching assistance  Upper Body Dressing: Setup or clean-up assistance  Lower Body Dressing: Partial/moderate assistance  Toilet Transfer: Supervision or touching assistance  Toilet Hygiene:      Speech  Therapy  1. Cognitive Diagnosis: Pt demonstrated functional temporal /spatial orientation; sustained/focused attention; concrete VPS/PS/reasoning and STM on testing. Deficits in domains of math language/numeric reasoning; higher level attention;and variable working memory. Pt reports at baseline, dtr assists with all banking. Pt denies status change and reports baseline of some memory problems.  Ongoing assessment with additional recommendations.  2. Speech Diagnosis: Audible and intelligible speech. Pt denies status change in motor speech or voice. He reports smoking history and recently stopping smoking cigar/pipe  3. Communication Diagnosis: Communication skills functional for concrete to mild complex receptive language skills and verbal expressive language skills        Body mass index is 31.08 kg/m².        Rehabilitation Diagnosis:   IGC: Right thalamic infarction        Assessment and Plan:     Right thalamic infarction- ASA, Plavix     HTN  -Cozaar, HCTZ     Diabetes type 2 - SSI, metformin     GERD - Protonix     COPD, smoker- Symbicort, albuterol     HLD - Lipitor    CODE: Full Code  Diet: ADULT DIET; Regular; Low Fat/Low Chol/High Fiber/ALVARO  Bowels: Per protocol  Bladder: Per protocol   Pain: Tylenol  DVT PPx: Lovenox  ELOS: TBD    Celso Martinez MD 11/2/2024, 8:30 AM    * This document was created using dictation software.  While all precautions were taken to ensure accuracy, errors may have occurred.  Please disregard any typographical errors.

## 2024-11-02 NOTE — PROGRESS NOTES
Physical Therapy  Facility/Department: [address] [facility]  Rehabilitation Physical Therapy Treatment Note    NAME: Sean Jackson  : 1955 (69 y.o.)  MRN: [id number]  CODE STATUS: Full Code    Date of Service: 24       Restrictions:  Restrictions/Precautions: Fall Risk  Position Activity Restriction  Other position/activity restrictions: JANI     SUBJECTIVE  Subjective  Subjective: Patient states that therapy \"wore me out\" yesterday.  Pain: denies pain    OBJECTIVE  Cognition  Overall Cognitive Status: Exceptions  Safety Judgement: Decreased awareness of need for assistance;Decreased awareness of need for safety  Orientation  Overall Orientation Status: Within Functional Limits    Functional Mobility  Balance  Sitting Balance: Stand by assistance  Standing Balance: Contact guard assistance (RW)  Standing Balance  Time: 5 mins  Activity: urination at commode (standing)  Comments: Able to maintain SBA-CGA at RW, no LOB  Transfers  Surface: To bed;From bed;To chair with arms;From chair with arms  Additional Factors: Increased time to complete  Device: Walker  Sit to Stand  Assistance Level: Contact guard assist;Minimal assistance  Skilled Clinical Factors: slight impulsive, Min A for lower chair surface  Stand to Sit  Assistance Level: Contact guard assist;Minimal assistance      Environmental Mobility  Ambulation  Surface: Level surface  Device: Rolling walker  Distance: 10' + 25' in the room; 110+80' x 2to the gym/back to the room  Activity: Within Room;Within Unit  Activity Comments: one seated rest break in the hallway ( bpm, SpO2 94%)  Assistance Level: Contact guard assist  Gait Deviations: Slow krish;Decreased step length left;Decreased heel strike left;Unsteady gait  Skilled Clinical Factors: decreased push off/foot drop LLE, lean towards Left side, variable path during turns around obstacles/in the room  Stairs  Stair Height: 6''  Device: Bilateral handrails  Number of Stairs: 4  Additional  Factors: Non-reciprocal going up;Non-reciprocal going down;Increased time to complete;Verbal cues  Assistance Level: Contact guard assist;Minimal assistance  Skilled Clinical Factors: trialed attempt ascending LLE/descending RLE first - cues for safety. Mild LOB  Curb  Curb Height: 6''  Device: Rolling walker  Number of Curbs: 1  Additional Factors: Increased time to complete  Assistance Level: Contact guard assist;Minimal assistance  Skilled Clinical Factors: needs Min A to manage walker - cues for sequence (states that curb step at home is at least 7-9\")         PT Exercises  A/AROM Exercises: Seated in chair: x10 AP (great toe extension > DF), 2 x 10 LAQ, marches (increased effort LLE).    NuStep for 10 mins to improve ROM, coordination, strength and endurance. Seat at 8/BUEs at 8, resistance at 6-7. Working on strength/power > endurance.   bpm, SpO2 93-94% after exercise.      ASSESSMENT/PROGRESS TOWARDS GOALS       Assessment  Assessment: Patient is a 68 yo male with Left hemiparesis and ataxia s/p acute ischemic stroke. This date patient demonstrates moderate RODRIGUEZ during exercises and ambulation tasks due to COPD (SpO2 maintained above 90%). Presents with LLE weakness and decreased mechanics during gait/stairs, however improved community distance of ambulation down to the gym for stairs and NuStep exercise. Patient slight impulsive during transfers and needs cues to improve safety awareness. Patient would benefit from skilled therapy at high frequency in order to facilitate return towards prior level.  Activity Tolerance: Patient limited by endurance  Discharge Recommendations: Continue to assess pending progress;Patient would benefit from continued therapy after discharge  PT Equipment Recommendations  Equipment Needed: No (cont to assess)    Goals  Patient Goals   Patient Goals : to strengthen LLE  Short Term Goals  Time Frame for Short Term Goals: 1 week  Short Term Goal 1: Bed mobility with S  Short

## 2024-11-02 NOTE — PROGRESS NOTES
ACUTE REHAB UNIT  SPEECH/LANGUAGE PATHOLOGY      [x] Daily  [] Weekly Care Conference Note  [x] Discharge    Patient:Sean Jackson      :1955  MRN:[id number]  Rehab Dx/Hx: Acute ischemic stroke (HCC) [I63.9]    Precautions: Fall risk  Home situation: Lives with a roommate; Dtr assists with banking  ST Dx: [] Aphasia  [] Dysarthria  [] Apraxia   [] Oropharyngeal dysphagia   [x] Cognitive  Impairment  [] Other:   Initial Speech Therapy Assessment Diagnosis:   Speech Therapy Diagnosis  1. Cognitive Diagnosis: Pt demonstrated functional temporal /spatial orienttion; sustained/focused attention; concrete VPS/PS/reasoning and STM on testing. Deficits in domains of math language/numeric reasoning; higher level attention;and variable working memory. Pt reports at baseline, dtr assists with all banking. Pt denies status change and reports baseline of some memory problems.  Ongoing assessment with additional recommendations.  2. Speech Diagnosis: Audible and intelligible speech. Pt denies status change in motor speech or voice. He reports smoking history and recently stopping smoking cigar/pipe  3. Communication Diagnosis: Communication skills functional for concrete to mild complex receptive language skills and verbal expressive language skills  4. Clinical Evaluation of Swallowing: deferred; Pt denies need for evaluation. No reports of problems at other facility or by staff here  Date of Admit: 10/31/2024  Room #: O0G-3666/3270-01  Date: 2024       Current functional status (updated daily):         Current Diet Order:ADULT DIET; Regular; Low Fat/Low Chol/High Fiber/ALVARO   Behavior: [x] Alert  [x] Cooperative  [x]  Pleasant  [] Confused  [] Agitated  [] Uncooperative  [] Distractible [] Motivated  [] Self-Limiting [] Anxious  [] Other:  Endurance:  [x] Adequate for participation in SLP sessions  [] Reduced overall  [] Lethargic  [] Other:  Safety: [] No concerns at this time  [] Reduced insight into deficits   []  Reduced safety awareness [] Not following call light procedures  [] Unable to Assess  [] Other: mobility deficits impacting gait  Swallowing Precautions: reinforced upright positioning for meals; review of general dysphagia s/s  Interventions used during Rehab Stay: see below:   [] Speech/Language Treatment  [] Instruction in HEP [] Group [] Dysphagia Treatment [x] Cognitive Treatment   [] Other:  Barriers toward progress: None unless medical or caregiver concerns      Date: 11/2/2024      Tx session 1 Tx session 2   Total Timed Code Min   SLP Individual Minutes  Time In: 0735  Time Out: 0805  Minutes: 30  Coded treatment time  30 N/a   Group Treatment Minutes 0 0   Co-Treat Minutes 0 0   Variance/Reason:  N/a    Pain denied    Pain Intervention [] RN notified  [] Repositioned  [] Intervention offered and patient declined  [] N/A  [] Other: [] RN notified  [] Repositioned  [] Intervention offered and patient declined  [] N/A  [] Other:   Subjective     Pt seen bedside  Pt up in bed  Pt was receptive to therapy  Good effort    Objective:  Goals     Goals: Pt goal is to return home and care for self with assist as needed. Dtr was already helping with certain home management tasks      Working toward pt goal N/a     Goal 1: Pt will demonstrate recall of daily events, learned safety strategies ; new information with use of strategies with set up and intermittent cues   Recall of this SLP: IND    Orientation: maintained    Recall of other therapies: 2/3 by label; 3/3 by activities    Recall of general objectives of ARU : general     Recall of any adaptive equipment being utilized for ambulation; shower; etc:  N/a     Goal 2: Pt will demonstrate functional concrete to mild complex PS /VPS and reasoning across functional PS situations with set up and intermittent cues     STEPHAN (norms >65 years of age):   Counting Money: pt achieved score profile of '1' indicating high probability of IND  Math Story Problems: pt

## 2024-11-02 NOTE — PROGRESS NOTES
Occupational Therapy  Facility/Department: [address] [facility]  Rehabilitation Occupational Therapy Daily Treatment Note    Date: 24  Patient Name: Sean Jackson       Room: R8Q-7214/3270-01  MRN: [id number]  Account: [id number]   : 1955  (69 y.o.) Gender: male                  Past Medical History:  has a past medical history of Asthma, GERD (gastroesophageal reflux disease), Hyperlipidemia, and Hypertension.  Past Surgical History:   has a past surgical history that includes eye muscle surgery; Wrist surgery; and Appendectomy.    Restrictions  Restrictions/Precautions: Fall Risk  Other position/activity restrictions: JANI    Subjective  Subjective: Pt met in room, in recliner, agreeable to OT session for shower. Denied pain at this time.  Restrictions/Precautions: Fall Risk             Objective     Cognition  Overall Cognitive Status: Exceptions  Safety Judgement: Decreased awareness of need for assistance;Decreased awareness of need for safety  Orientation  Overall Orientation Status: Within Functional Limits         ADL  Feeding  Assistance Level: Independent  Skilled Clinical Factors: OT warmed food up for pt, he ate w/o assistance  Grooming/Oral Hygiene  Assistance Level: Contact guard assist;Stand by assist  Skilled Clinical Factors: Pt stood at sink SBA/CGA to complete oral hygiene  Upper Extremity Bathing  Assistance Level: Stand by assist;Set-up  Skilled Clinical Factors: Pt bathed UB seated on shower chair in stall  Lower Extremity Bathing  Assistance Level: Minimal assistance;Set-up  Skilled Clinical Factors: Pt bathed LB seated in shower chair in stall. He was able to reach R foot/lower leg, and L lower leg, S/OT assisted w/ L foot. He stood w/ grab bars CGA to bathe kathryn-area and buttocks, bathed back to front and was educated not to go to kathryn-area after bottom as it can cause UTI/infection. He stood w/ grab bars to dry buttocks/kathryn-area.  Upper Extremity Dressing  Assistance Level:

## 2024-11-02 NOTE — PROGRESS NOTES
Patient admitted to rehab s/p CVA.  A/Ox4. Follows commands but has slightly poor safety awareness (needs reminded of rules for safety). Speech is not effected from stroke. Noted with mild weakness to left upper and lower ext. Transfers with front wheeled walker, gait-belt, and CGA x 1. Ambulates and tolerates fairly well with cues to use walker appropriately. Mobility restrictions: WBAT. On Cardiac diet, tolerating well. Medications taken whole with thin liquids without swallowing difficulty. On po Plavix, sq Lovenox, and knee high kamryn hose for DVT prophylaxis.  Skin: noted with scattered bruising, abrasions, and psorisis. Small tear on admission to vertical gluteal cleft. Oxygen: RA. Denies pain or discomfort, encouraged to inform staff of any changes in condition. LDA: None. Has been continent of bladder. LBM 10/31. Chair/bed alarms in use and call light in reach. Will continue to monitor and assist as needed.

## 2024-11-03 LAB
GLUCOSE BLD-MCNC: 122 MG/DL (ref 70–99)
GLUCOSE BLD-MCNC: 144 MG/DL (ref 70–99)
GLUCOSE BLD-MCNC: 151 MG/DL (ref 70–99)
GLUCOSE BLD-MCNC: 163 MG/DL (ref 70–99)
PERFORMED ON: ABNORMAL

## 2024-11-03 PROCEDURE — 6370000000 HC RX 637 (ALT 250 FOR IP): Performed by: PHYSICAL MEDICINE & REHABILITATION

## 2024-11-03 PROCEDURE — 6360000002 HC RX W HCPCS: Performed by: PHYSICAL MEDICINE & REHABILITATION

## 2024-11-03 PROCEDURE — 94640 AIRWAY INHALATION TREATMENT: CPT

## 2024-11-03 PROCEDURE — 94760 N-INVAS EAR/PLS OXIMETRY 1: CPT

## 2024-11-03 PROCEDURE — 1280000000 HC REHAB R&B

## 2024-11-03 RX ADMIN — HYDROCHLOROTHIAZIDE 25 MG: 25 TABLET ORAL at 08:27

## 2024-11-03 RX ADMIN — Medication 3 MG: at 20:32

## 2024-11-03 RX ADMIN — ANTI-FUNGAL POWDER MICONAZOLE NITRATE TALC FREE: 1.42 POWDER TOPICAL at 20:32

## 2024-11-03 RX ADMIN — METFORMIN HYDROCHLORIDE 500 MG: 500 TABLET, EXTENDED RELEASE ORAL at 08:28

## 2024-11-03 RX ADMIN — ASPIRIN 81 MG 81 MG: 81 TABLET ORAL at 08:28

## 2024-11-03 RX ADMIN — ENOXAPARIN SODIUM 40 MG: 100 INJECTION SUBCUTANEOUS at 08:31

## 2024-11-03 RX ADMIN — ANTI-FUNGAL POWDER MICONAZOLE NITRATE TALC FREE: 1.42 POWDER TOPICAL at 08:31

## 2024-11-03 RX ADMIN — ALBUTEROL SULFATE 2 PUFF: 90 AEROSOL, METERED RESPIRATORY (INHALATION) at 08:03

## 2024-11-03 RX ADMIN — LOSARTAN POTASSIUM 100 MG: 100 TABLET, FILM COATED ORAL at 08:28

## 2024-11-03 RX ADMIN — BUDESONIDE AND FORMOTEROL FUMARATE DIHYDRATE 2 PUFF: 160; 4.5 AEROSOL RESPIRATORY (INHALATION) at 08:03

## 2024-11-03 RX ADMIN — ATORVASTATIN CALCIUM 10 MG: 10 TABLET, FILM COATED ORAL at 08:28

## 2024-11-03 RX ADMIN — BUDESONIDE AND FORMOTEROL FUMARATE DIHYDRATE 2 PUFF: 160; 4.5 AEROSOL RESPIRATORY (INHALATION) at 20:09

## 2024-11-03 RX ADMIN — PANTOPRAZOLE SODIUM 40 MG: 40 TABLET, DELAYED RELEASE ORAL at 07:24

## 2024-11-03 RX ADMIN — CLOPIDOGREL BISULFATE 75 MG: 75 TABLET ORAL at 08:28

## 2024-11-03 RX ADMIN — Medication 400 MG: at 08:28

## 2024-11-03 ASSESSMENT — PAIN SCALES - GENERAL: PAINLEVEL_OUTOF10: 0

## 2024-11-03 NOTE — PLAN OF CARE
Problem: Discharge Planning  Goal: Discharge to home or other facility with appropriate resources  Recent Flowsheet Documentation  Taken 11/2/2024 2147 by Magnolia Watkins, RN  Discharge to home or other facility with appropriate resources: Identify barriers to discharge with patient and caregiver     Problem: Chronic Conditions and Co-morbidities  Goal: Patient's chronic conditions and co-morbidity symptoms are monitored and maintained or improved  Recent Flowsheet Documentation  Taken 11/2/2024 2147 by Magnolia Watkins, RN  Care Plan - Patient's Chronic Conditions and Co-Morbidity Symptoms are Monitored and Maintained or Improved: Monitor and assess patient's chronic conditions and comorbid symptoms for stability, deterioration, or improvement     Problem: Skin/Tissue Integrity  Goal: Absence of new skin breakdown  Description: 1.  Monitor for areas of redness and/or skin breakdown  2.  Assess vascular access sites hourly  3.  Every 4-6 hours minimum:  Change oxygen saturation probe site  4.  Every 4-6 hours:  If on nasal continuous positive airway pressure, respiratory therapy assess nares and determine need for appliance change or resting period.  Outcome: Progressing     Problem: Safety - Adult  Goal: Free from fall injury  Outcome: Progressing     Problem: Pain  Goal: Verbalizes/displays adequate comfort level or baseline comfort level  Outcome: Progressing     Problem: Nutrition Deficit:  Goal: Optimize nutritional status  Outcome: Progressing     Problem: ABCDS Injury Assessment  Goal: Absence of physical injury  Outcome: Progressing  Flowsheets (Taken 11/3/2024 0131)  Absence of Physical Injury: Implement safety measures based on patient assessment

## 2024-11-03 NOTE — PLAN OF CARE
Problem: Discharge Planning  Goal: Discharge to home or other facility with appropriate resources  Outcome: Progressing

## 2024-11-04 LAB
ANION GAP SERPL CALCULATED.3IONS-SCNC: 10 MMOL/L (ref 3–16)
BASOPHILS # BLD: 0 K/UL (ref 0–0.2)
BASOPHILS NFR BLD: 0.9 %
BUN SERPL-MCNC: 15 MG/DL (ref 7–20)
CALCIUM SERPL-MCNC: 9 MG/DL (ref 8.3–10.6)
CHLORIDE SERPL-SCNC: 95 MMOL/L (ref 99–110)
CO2 SERPL-SCNC: 26 MMOL/L (ref 21–32)
CREAT SERPL-MCNC: 0.6 MG/DL (ref 0.8–1.3)
DEPRECATED RDW RBC AUTO: 14.4 % (ref 12.4–15.4)
EOSINOPHIL # BLD: 0.5 K/UL (ref 0–0.6)
EOSINOPHIL NFR BLD: 7.9 %
GFR SERPLBLD CREATININE-BSD FMLA CKD-EPI: >90 ML/MIN/{1.73_M2}
GLUCOSE BLD-MCNC: 108 MG/DL (ref 70–99)
GLUCOSE BLD-MCNC: 121 MG/DL (ref 70–99)
GLUCOSE BLD-MCNC: 130 MG/DL (ref 70–99)
GLUCOSE BLD-MCNC: 162 MG/DL (ref 70–99)
GLUCOSE SERPL-MCNC: 104 MG/DL (ref 70–99)
HCT VFR BLD AUTO: 42 % (ref 40.5–52.5)
HGB BLD-MCNC: 13.9 G/DL (ref 13.5–17.5)
LYMPHOCYTES # BLD: 1.6 K/UL (ref 1–5.1)
LYMPHOCYTES NFR BLD: 27.5 %
MAGNESIUM SERPL-MCNC: 1.79 MG/DL (ref 1.8–2.4)
MCH RBC QN AUTO: 26.9 PG (ref 26–34)
MCHC RBC AUTO-ENTMCNC: 33.1 G/DL (ref 31–36)
MCV RBC AUTO: 81.2 FL (ref 80–100)
MONOCYTES # BLD: 0.7 K/UL (ref 0–1.3)
MONOCYTES NFR BLD: 12.3 %
NEUTROPHILS # BLD: 3 K/UL (ref 1.7–7.7)
NEUTROPHILS NFR BLD: 51.4 %
PERFORMED ON: ABNORMAL
PLATELET # BLD AUTO: 213 K/UL (ref 135–450)
PMV BLD AUTO: 7.9 FL (ref 5–10.5)
POTASSIUM SERPL-SCNC: 3.7 MMOL/L (ref 3.5–5.1)
RBC # BLD AUTO: 5.17 M/UL (ref 4.2–5.9)
SODIUM SERPL-SCNC: 131 MMOL/L (ref 136–145)
WBC # BLD AUTO: 5.8 K/UL (ref 4–11)

## 2024-11-04 PROCEDURE — 97530 THERAPEUTIC ACTIVITIES: CPT

## 2024-11-04 PROCEDURE — 1280000000 HC REHAB R&B

## 2024-11-04 PROCEDURE — 97530 THERAPEUTIC ACTIVITIES: CPT | Performed by: PHYSICAL THERAPIST

## 2024-11-04 PROCEDURE — 80048 BASIC METABOLIC PNL TOTAL CA: CPT

## 2024-11-04 PROCEDURE — 83735 ASSAY OF MAGNESIUM: CPT

## 2024-11-04 PROCEDURE — 94640 AIRWAY INHALATION TREATMENT: CPT

## 2024-11-04 PROCEDURE — 97535 SELF CARE MNGMENT TRAINING: CPT

## 2024-11-04 PROCEDURE — 6360000002 HC RX W HCPCS: Performed by: PHYSICAL MEDICINE & REHABILITATION

## 2024-11-04 PROCEDURE — 6370000000 HC RX 637 (ALT 250 FOR IP): Performed by: PHYSICAL MEDICINE & REHABILITATION

## 2024-11-04 PROCEDURE — 94760 N-INVAS EAR/PLS OXIMETRY 1: CPT

## 2024-11-04 PROCEDURE — 36415 COLL VENOUS BLD VENIPUNCTURE: CPT

## 2024-11-04 PROCEDURE — 85025 COMPLETE CBC W/AUTO DIFF WBC: CPT

## 2024-11-04 PROCEDURE — 97110 THERAPEUTIC EXERCISES: CPT | Performed by: PHYSICAL THERAPIST

## 2024-11-04 PROCEDURE — 97116 GAIT TRAINING THERAPY: CPT | Performed by: PHYSICAL THERAPIST

## 2024-11-04 RX ADMIN — ENOXAPARIN SODIUM 40 MG: 100 INJECTION SUBCUTANEOUS at 07:15

## 2024-11-04 RX ADMIN — LOSARTAN POTASSIUM 100 MG: 100 TABLET, FILM COATED ORAL at 07:17

## 2024-11-04 RX ADMIN — BUDESONIDE AND FORMOTEROL FUMARATE DIHYDRATE 2 PUFF: 160; 4.5 AEROSOL RESPIRATORY (INHALATION) at 09:21

## 2024-11-04 RX ADMIN — Medication 400 MG: at 07:17

## 2024-11-04 RX ADMIN — PANTOPRAZOLE SODIUM 40 MG: 40 TABLET, DELAYED RELEASE ORAL at 05:19

## 2024-11-04 RX ADMIN — CLOPIDOGREL BISULFATE 75 MG: 75 TABLET ORAL at 07:17

## 2024-11-04 RX ADMIN — METFORMIN HYDROCHLORIDE 500 MG: 500 TABLET, EXTENDED RELEASE ORAL at 07:58

## 2024-11-04 RX ADMIN — ASPIRIN 81 MG 81 MG: 81 TABLET ORAL at 07:18

## 2024-11-04 RX ADMIN — ANTI-FUNGAL POWDER MICONAZOLE NITRATE TALC FREE: 1.42 POWDER TOPICAL at 08:20

## 2024-11-04 RX ADMIN — BUDESONIDE AND FORMOTEROL FUMARATE DIHYDRATE 2 PUFF: 160; 4.5 AEROSOL RESPIRATORY (INHALATION) at 19:35

## 2024-11-04 RX ADMIN — ATORVASTATIN CALCIUM 10 MG: 10 TABLET, FILM COATED ORAL at 07:17

## 2024-11-04 RX ADMIN — HYDROCHLOROTHIAZIDE 25 MG: 25 TABLET ORAL at 07:16

## 2024-11-04 RX ADMIN — Medication 400 MG: at 19:37

## 2024-11-04 ASSESSMENT — PAIN SCALES - GENERAL
PAINLEVEL_OUTOF10: 0

## 2024-11-04 NOTE — PROGRESS NOTES
turns around obstacles/in the room  Stairs  Stair Height: 6''  Device: Bilateral handrails  Number of Stairs: 4  Additional Factors: Non-reciprocal going up;Non-reciprocal going down;Increased time to complete;Verbal cues  Assistance Level: Contact guard assist  Curb  Curb Height: 6''  Device: Rolling walker  Number of Curbs: 1  Additional Factors: Increased time to complete;Verbal cues  Assistance Level: Contact guard assist  Amb 100' x2 on uneven surface outside on therapy gym patio           PT Exercises  Exercise Treatment: Sitting: marching, DF, BTB hamstring curls, BTB abduction x 10 reps  In // bars: lateral stepping x3, mini squats, heel raises 2 x10 reps  Nustep level 7 for 10 minutes to strengthen BLEs, activity tolerance, and performance with reciprocal patterns required for ambulation and stairs  Eversion/inversion/DF x 10 reps; pt struggled with eversion and DF      ASSESSMENT/PROGRESS TOWARDS GOALS       Assessment  Assessment: Pt met in dept for PT session this AM. Pt able to amb 150', 60', 125', 50' x1 with RW and CGA/SBA. Pt required standing rest breaks throughout as he states he is winded from his COPD. Pt demonstrates L toe drag during ambulation, requiring verbal cues to march his LLE up high to clear his foot. Pt shown improvements in marching and DF exercises this AM compared to previous sessions. Pt able to complete transfers and car transfer with CGA/SBA with some verbal cueing required for hand placement. Pt able to ascend/descend 4 steps with BHR use and CGA. PT able to ascend/descend curb step with RW and CGA x1. Pt can be impulsive at times and move too quickly, requiring verbal instruction to slow down. Pt is still performing below baseline and would benefit from continued skilled PT to improve strength, activity tolerance, and functional mobility to return home.  Activity Tolerance: Patient tolerated treatment well;Patient limited by fatigue  Discharge Recommendations: Continue to  education & training;Therapeutic activities  Safety Devices  Type of Devices: All fall risk precautions in place;Gait belt;Patient at risk for falls;Left in chair      Therapy Time   Individual Concurrent Group Co-treatment   Time In 1000         Time Out 1045         Minutes 45           Timed Code Treatment Minutes: 45 Minutes    Second Session Therapy Time     Individual Co-treatment   Time In 1230     Time Out 1315     Minutes 45            Therapist was present, directed the patient's care, made skilled judgement, and was responsible for assessment and treatment of the patient.       LATASHA Sood, 11/04/24 at 3:45 PM  Electronically signed by LATASHA Sood on 11/4/24 at 11:46 AM EST      Electronically signed by ARMANDO PARR PT on 11/4/24 at 4:10 PM EST

## 2024-11-04 NOTE — PROGRESS NOTES
Patient admitted to rehab with CVA.  A/Ox4. Transfers with walker and cues. On cardiac diet, tolerating well, reviewed diabetic concerns. Medications taken whole. On Lovenox for DVT prophylaxis.  Skin: monitor. Has been continent of bladder. LBM 11/03/2021. Chair/bed alarms in use and call light in reach. Will monitor for safety.

## 2024-11-04 NOTE — PROGRESS NOTES
Occupational Therapy  OCCUPATIONAL THERAPY  Progress Note   Second Session    Patient Name: Sean Jackson  Medical Record Number: [id number]    Treatment Diagnosis: Impaired functional mobility    General  Chart Reviewed: Yes  Patient assessed for rehabilitation services?: Yes  Additional Pertinent Hx: Per H&P \"Patient is a 69-year-old male who developed a sudden onset of left arm and leg weakness, ataxia, and dysarthria.  Patient was brought to the hospital where MRI scan of the brain revealed a small acute infarction of the right thalamus.  Patient known to have history of hypertension, diabetes type 2, COPD, and increased lipids.  Patient seen by neurology and started on aspirin and Plavix.  Patient started in therapies, and needs min assist for transfers and gait of 40 feet due to his weakness and ataxia.  Patient needs min to mod assist for lower body bathing and dressing activities.  Patient lives with a roommate in a mobile home and was independent prior to admission.  He states his son and daughter-in-law lives next-door.\"  Family / Caregiver Present: No  Referring Practitioner: Beryl Greenberg MD  Diagnosis: acute ischemic stroke     Restrictions/Precautions  Restrictions/Precautions: Fall Risk        Position Activity Restriction  Other position/activity restrictions: JANI    Subjective: Pt met in therapy gym, reported no pain, agreeable to OT tx    Objective: to address strength for ADLs/fxl ADL transfers, LB ADLs    Assessment: Pt completed BUE HEP 3x10 w/ green resistance band (shoulder press, chest press, and horizontal abduction. He did not have JANI on prior to session. He was able to doff shoes, and don JANI and shoes w/ significantly increased time/effort and SBA. He completed all fxl transfers/mob and car transfer w/ SBA. Discussed possible drivers evaluation w/ pt, pt does not feel he needs it, but this therapist still strongly rec it. Pt left in recliner at the end of the session w/ alarm

## 2024-11-04 NOTE — PATIENT CARE CONFERENCE
[facility]  Inpatient Rehabilitation  Weekly Team Conference Note      Date: 2024  Patient Name:  Sean Jackson    MRN: [id number]  : 1955  Gender: Male  Physician: Dr. Yari SWAN  Diagnosis: Acute ischemic stroke (HCC) [I63.9]    CASE MANAGEMENT  Assessment: Lives with a friend in a mobile home with a dog. He is not agreeable to home care due to the dog, but his dIL wants him to think about it.     PHYSICAL THERAPY    Bed Mobility:  Overall Assistance Level: Stand By Assist, Supervision  Sit>supine:  Assistance Level: Stand by assist  Supine>sit:  Assistance Level: Stand by assist    Transfers:  Surface: Wheelchair, From bed, To bed  Additional Factors: Hand placement cues  Device: Walker  Sit>stand:  Assistance Level: Contact guard assist, Stand by assist  Skilled Clinical Factors: slight impulsive, Min A for lower chair surface  Stand>sit:  Assistance Level: Contact guard assist, Minimal assistance  Bed<>chair   Assistance Level: Contact guard assist, stand by assist  Car transfer:  Assistance Level: Contact guard assist, Stand by assist    Ambulation:  Surface: Level surface, Carpet  Device: Rolling walker  Distance: 150', 60', 125', 50' x1  Activity: Within Unit  Activity Comments: standing rest breaks in hallway  Additional Factors: Increased time to complete, Verbal cues  Assistance Level: Contact guard assist, Stand by assist  Gait Deviations: Slow krish, Decreased step length left, Decreased heel strike left, Unsteady gait  Skilled Clinical Factors: decreased push off/foot drop LLE, lean towards Left side, variable path during turns around obstacles/in the room    Stairs:  Stair Height: 6''  Device: Bilateral handrails  Number of Stairs: 4  Additional Factors: Non-reciprocal going up, Non-reciprocal going down, Increased time to complete, Verbal cues  Assistance Level: Contact guard assist  Skilled Clinical Factors: trialed attempt ascending LLE/descending RLE first -

## 2024-11-04 NOTE — PROGRESS NOTES
bathroom  Assistance Level: Contact guard assist;Stand by assist  Supine to Sit  Assistance Level: Stand by assist  Sit to Stand  Assistance Level: Contact guard assist;Stand by assist  Stand to Sit  Assistance Level: Contact guard assist;Stand by assist  Bed To/From Chair  Assistance Level: Contact guard assist;Stand by assist         Assessment  Assessment  Assessment: Pt tolerated session well.  He was able to complete mobility and transfers in his room with CGA/SBA.  He completed bathing seated on the shower chair with SBA.  He completed dressing tasks with CGA/SBA to stand to pull up his pants and underwear.  He stood at the sink to complete oral hygiene with CGA/SBA.  Activity Tolerance: Patient tolerated treatment well  Discharge Recommendations: Home with assist PRN  Safety Devices  Type of devices: Call light within reach;Chair alarm in place;Left in chair;Gait belt      Plan  Occupational Therapy Plan  Times Per Week: 5-6  Times Per Day: Twice a day  Days Per Week: 5 Days  Hours Per Day: 1.5 hours  Therapy Duration: 10 Days  Current Treatment Recommendations: Strengthening;ROM;Balance training;Functional mobility training;Endurance training;Safety education & training;Patient/Caregiver education & training;Equipment evaluation, education, & procurement;Self-Care / ADL;Home management training    Goals  Patient Goals   Patient goals : \"to be independent\"  Short Term Goals  Time Frame for Short Term Goals: 10 days  Short Term Goal 1: pt will complete toileting w/ Mod I  Short Term Goal 2: pt will complete LB dressing/bathing w/ Mod I  Short Term Goal 3: pt will complete simple meal prep w/ Mod I  Short Term Goal 4: pt will complete BUE HEP in all planes to address strength required for ADLs/fxl ADL transfers  Short Term Goal 5: pt will complete family edu closer to d/c to address potential DME concerns  Long Term Goals  Time Frame for Long Term Goals : LTG-STG      Therapy Time   Individual Concurrent Group  Co-treatment   Time In 0745         Time Out 0840         Minutes 55               ARMANI Goldman/ERIKA [id number]

## 2024-11-04 NOTE — PLAN OF CARE
RN  Outcome: Progressing  11/3/2024 1045 by Niharika Lacey RN  Outcome: Progressing     Problem: Nutrition Deficit:  Goal: Optimize nutritional status  11/3/2024 2351 by Jad Brasher RN  Outcome: Progressing  11/3/2024 1045 by Niharika Lacey RN  Outcome: Progressing     Problem: ABCDS Injury Assessment  Goal: Absence of physical injury  11/3/2024 2351 by Jad Brasher RN  Outcome: Progressing  Flowsheets (Taken 11/3/2024 2350)  Absence of Physical Injury: Implement safety measures based on patient assessment  11/3/2024 1045 by Niharika Lacey RN  Outcome: Progressing

## 2024-11-04 NOTE — PROGRESS NOTES
Department of Physical Medicine & Rehabilitation  Progress Note    Patient Identification:  Sean Jackson  [id number]  : 1955  Admit date: 10/31/2024    Chief Complaint: Acute ischemic stroke (HCC)    Subjective:   No acute events overnight.   Patient seen this afternoon sitting up in room. Reports therapy going well. He has noted improvements in LUE coordination but not in the LLE yet.   Labs reviewed.     ROS: No f/c, n/v, cp     Objective:  Patient Vitals for the past 24 hrs:   BP Temp Temp src Pulse Resp SpO2 Weight   24 0923 -- -- -- -- -- 92 % --   24 0713 (!) 155/95 97.3 °F (36.3 °C) Oral 78 17 91 % --   24 0654 -- -- -- -- -- -- 86 kg (189 lb 9.5 oz)   24 -- -- -- 89 18 94 % --   24 130/80 98.1 °F (36.7 °C) Oral 77 19 91 % --     Const: Alert. No distress, pleasant.   HEENT: Normocephalic, atraumatic. Normal sclera/conjunctiva. MMM.   CV: Regular rate and rhythm.   Resp: No respiratory distress. Lungs CTAB.   Abd: Soft, nontender, nondistended, NABS+   Ext: No edema.   Neuro: Alert, oriented, appropriately interactive. Left hemiparesis (LUE 4+/5, LLE 4/5 except 1/5 left ankle DF and PF). Decreased coordination left hemibody  Psych: Cooperative, appropriate mood and affect    Laboratory data: Available via EMR.   Last 24 hour lab  Recent Results (from the past 24 hour(s))   POCT Glucose    Collection Time: 24  4:22 PM   Result Value Ref Range    POC Glucose 151 (H) 70 - 99 mg/dl    Performed on ACCU-CHEK    POCT Glucose    Collection Time: 24  8:03 PM   Result Value Ref Range    POC Glucose 144 (H) 70 - 99 mg/dl    Performed on ACCU-CHEK    CBC with Auto Differential    Collection Time: 24  4:33 AM   Result Value Ref Range    WBC 5.8 4.0 - 11.0 K/uL    RBC 5.17 4.20 - 5.90 M/uL    Hemoglobin 13.9 13.5 - 17.5 g/dL    Hematocrit 42.0 40.5 - 52.5 %    MCV 81.2 80.0 - 100.0 fL    MCH 26.9 26.0 - 34.0 pg    MCHC 33.1 31.0 - 36.0 g/dL    RDW 14.4  50' x1  Activity: Within Unit  Activity Comments: standing rest breaks in hallway  Additional Factors: Increased time to complete, Verbal cues  Assistance Level: Contact guard assist, Stand by assist  Gait Deviations: Slow krish, Decreased step length left, Decreased heel strike left, Unsteady gait  Skilled Clinical Factors: decreased push off/foot drop LLE, lean towards Left side, variable path during turns around obstacles/in the room  Stairs:  Stair Height: 6''  Device: Bilateral handrails  Number of Stairs: 4  Additional Factors: Non-reciprocal going up, Non-reciprocal going down, Increased time to complete, Verbal cues  Assistance Level: Contact guard assist  Skilled Clinical Factors: trialed attempt ascending LLE/descending RLE first - cues for safety. Mild LOB  Curb:  Curb Height: 6''  Device: Rolling walker  Number of Curbs: 1  Additional Factors: Increased time to complete, Verbal cues  Assistance Level: Contact guard assist  Skilled Clinical Factors: needs Min A to manage walker - cues for sequence (states that curb step at home is at least 7-9\")  Wheelchair:       Occupational therapy:   Feeding  Assistance Level: Independent  Skilled Clinical Factors: OT warmed food up for pt, he ate w/o assistance  Grooming/Oral Hygiene  Assistance Level: Contact guard assist, Stand by assist  Skilled Clinical Factors: Stood at the sink to complete oral care.  UE Bathing  Assistance Level: Stand by assist  Skilled Clinical Factors: Seated on the shower chair.  LE Bathing  Assistance Level: Contact guard assist  Skilled Clinical Factors: Pt crossed his legs to wash his feet.  Pt leaned to the side to wash buttocks.  UE Dressing  Assistance Level: Set-up  Skilled Clinical Factors: Pt doffed shirt seated on shower chair, sat in w/c to don  LE Dressing  Equipment Provided: Reachers  Assistance Level: Contact guard assist, Stand by assist  Skilled Clinical Factors: Pt crossed his legs to thread his feet in his shorts and

## 2024-11-04 NOTE — PLAN OF CARE
Problem: Discharge Planning  Goal: Discharge to home or other facility with appropriate resources  11/4/2024 1010 by Magnolia Adhikari RN  Outcome: Progressing  Flowsheets (Taken 11/4/2024 0709)  Discharge to home or other facility with appropriate resources:   Identify barriers to discharge with patient and caregiver   Arrange for needed discharge resources and transportation as appropriate   Identify discharge learning needs (meds, wound care, etc)   Refer to discharge planning if patient needs post-hospital services based on physician order or complex needs related to functional status, cognitive ability or social support system  11/3/2024 2351 by Jad Brasher, RN  Outcome: Progressing     Problem: Chronic Conditions and Co-morbidities  Goal: Patient's chronic conditions and co-morbidity symptoms are monitored and maintained or improved  11/4/2024 1010 by Magnolia Adhikari RN  Outcome: Progressing  Flowsheets (Taken 11/4/2024 0709)  Care Plan - Patient's Chronic Conditions and Co-Morbidity Symptoms are Monitored and Maintained or Improved:   Monitor and assess patient's chronic conditions and comorbid symptoms for stability, deterioration, or improvement   Collaborate with multidisciplinary team to address chronic and comorbid conditions and prevent exacerbation or deterioration   Update acute care plan with appropriate goals if chronic or comorbid symptoms are exacerbated and prevent overall improvement and discharge  11/3/2024 2351 by Jad Brasher, RN  Outcome: Progressing     Problem: Skin/Tissue Integrity  Goal: Absence of new skin breakdown  Description: 1.  Monitor for areas of redness and/or skin breakdown  2.  Assess vascular access sites hourly  3.  Every 4-6 hours minimum:  Change oxygen saturation probe site  4.  Every 4-6 hours:  If on nasal continuous positive airway pressure, respiratory therapy assess nares and determine need for appliance change or resting  period.  11/4/2024 1010 by Magnolia Adhikari RN  Outcome: Progressing  11/3/2024 2351 by Jad Brasher RN  Outcome: Progressing     Problem: Safety - Adult  Goal: Free from fall injury  11/4/2024 1010 by Magnolia Adhikari RN  Outcome: Progressing  Flowsheets (Taken 11/4/2024 0907)  Free From Fall Injury: Instruct family/caregiver on patient safety  Note: Continue cues with walker safety.  11/3/2024 2351 by Jad Brasher RN  Outcome: Progressing  Flowsheets (Taken 11/3/2024 2350)  Free From Fall Injury: Instruct family/caregiver on patient safety     Problem: Pain  Goal: Verbalizes/displays adequate comfort level or baseline comfort level  11/4/2024 1010 by Magnolia Adhikari RN  Outcome: Progressing  Flowsheets (Taken 11/4/2024 0713)  Verbalizes/displays adequate comfort level or baseline comfort level:   Encourage patient to monitor pain and request assistance   Assess pain using appropriate pain scale   Administer analgesics based on type and severity of pain and evaluate response   Implement non-pharmacological measures as appropriate and evaluate response  11/3/2024 2351 by Jad Brasher RN  Outcome: Progressing     Problem: Nutrition Deficit:  Goal: Optimize nutritional status  11/4/2024 1010 by Magnolia Adhikari RN  Outcome: Progressing  11/3/2024 2351 by Jad Brasher RN  Outcome: Progressing     Problem: ABCDS Injury Assessment  Goal: Absence of physical injury  11/4/2024 1010 by Magnolia Adhikari RN  Outcome: Progressing  Flowsheets (Taken 11/4/2024 0907)  Absence of Physical Injury: Implement safety measures based on patient assessment  11/3/2024 2351 by Jad Brasher RN  Outcome: Progressing  Flowsheets (Taken 11/3/2024 2350)  Absence of Physical Injury: Implement safety measures based on patient assessment

## 2024-11-05 LAB
GLUCOSE BLD-MCNC: 115 MG/DL (ref 70–99)
GLUCOSE BLD-MCNC: 117 MG/DL (ref 70–99)
GLUCOSE BLD-MCNC: 118 MG/DL (ref 70–99)
GLUCOSE BLD-MCNC: 160 MG/DL (ref 70–99)
PERFORMED ON: ABNORMAL

## 2024-11-05 PROCEDURE — 97110 THERAPEUTIC EXERCISES: CPT

## 2024-11-05 PROCEDURE — 6360000002 HC RX W HCPCS: Performed by: PHYSICAL MEDICINE & REHABILITATION

## 2024-11-05 PROCEDURE — 97530 THERAPEUTIC ACTIVITIES: CPT

## 2024-11-05 PROCEDURE — 6370000000 HC RX 637 (ALT 250 FOR IP): Performed by: PHYSICAL MEDICINE & REHABILITATION

## 2024-11-05 PROCEDURE — 97535 SELF CARE MNGMENT TRAINING: CPT

## 2024-11-05 PROCEDURE — 97530 THERAPEUTIC ACTIVITIES: CPT | Performed by: PHYSICAL THERAPIST

## 2024-11-05 PROCEDURE — 1280000000 HC REHAB R&B

## 2024-11-05 PROCEDURE — 94640 AIRWAY INHALATION TREATMENT: CPT

## 2024-11-05 PROCEDURE — 97116 GAIT TRAINING THERAPY: CPT | Performed by: PHYSICAL THERAPIST

## 2024-11-05 PROCEDURE — 94760 N-INVAS EAR/PLS OXIMETRY 1: CPT

## 2024-11-05 RX ADMIN — METFORMIN HYDROCHLORIDE 500 MG: 500 TABLET, EXTENDED RELEASE ORAL at 08:42

## 2024-11-05 RX ADMIN — HYDROCHLOROTHIAZIDE 25 MG: 25 TABLET ORAL at 08:43

## 2024-11-05 RX ADMIN — CLOPIDOGREL BISULFATE 75 MG: 75 TABLET ORAL at 08:43

## 2024-11-05 RX ADMIN — ASPIRIN 81 MG 81 MG: 81 TABLET ORAL at 08:43

## 2024-11-05 RX ADMIN — BUDESONIDE AND FORMOTEROL FUMARATE DIHYDRATE 2 PUFF: 160; 4.5 AEROSOL RESPIRATORY (INHALATION) at 20:04

## 2024-11-05 RX ADMIN — ANTI-FUNGAL POWDER MICONAZOLE NITRATE TALC FREE: 1.42 POWDER TOPICAL at 08:44

## 2024-11-05 RX ADMIN — ATORVASTATIN CALCIUM 10 MG: 10 TABLET, FILM COATED ORAL at 08:43

## 2024-11-05 RX ADMIN — BUDESONIDE AND FORMOTEROL FUMARATE DIHYDRATE 2 PUFF: 160; 4.5 AEROSOL RESPIRATORY (INHALATION) at 08:15

## 2024-11-05 RX ADMIN — Medication 400 MG: at 08:43

## 2024-11-05 RX ADMIN — LOSARTAN POTASSIUM 100 MG: 100 TABLET, FILM COATED ORAL at 08:43

## 2024-11-05 RX ADMIN — ANTI-FUNGAL POWDER MICONAZOLE NITRATE TALC FREE: 1.42 POWDER TOPICAL at 20:22

## 2024-11-05 RX ADMIN — PANTOPRAZOLE SODIUM 40 MG: 40 TABLET, DELAYED RELEASE ORAL at 08:48

## 2024-11-05 RX ADMIN — Medication 400 MG: at 20:28

## 2024-11-05 RX ADMIN — ENOXAPARIN SODIUM 40 MG: 100 INJECTION SUBCUTANEOUS at 08:43

## 2024-11-05 ASSESSMENT — PAIN SCALES - GENERAL: PAINLEVEL_OUTOF10: 0

## 2024-11-05 NOTE — CARE COORDINATION
SW attempted to meet with patient, however, he was away from the room and working with therapy. MIRIAN will try again later if time permits.     Respectfully submitted,    Airam ANDERSON, KENDRICK  [facility]   [phone]    Electronically signed by JUSTIN Finley, SANDRA on 11/5/2024 at 2:01 PM

## 2024-11-05 NOTE — PLAN OF CARE
Problem: Discharge Planning  Goal: Discharge to home or other facility with appropriate resources  11/5/2024 1058 by Sadie Rios RN  Outcome: Progressing  Flowsheets (Taken 11/5/2024 1058)  Discharge to home or other facility with appropriate resources:   Identify barriers to discharge with patient and caregiver   Identify discharge learning needs (meds, wound care, etc)   Refer to discharge planning if patient needs post-hospital services based on physician order or complex needs related to functional status, cognitive ability or social support system   Arrange for needed discharge resources and transportation as appropriate  11/5/2024 0244 by Magnolia Watkins, RN  Outcome: Progressing  Flowsheets (Taken 11/4/2024 2100)  Discharge to home or other facility with appropriate resources: Identify barriers to discharge with patient and caregiver     Problem: Chronic Conditions and Co-morbidities  Goal: Patient's chronic conditions and co-morbidity symptoms are monitored and maintained or improved  11/5/2024 1058 by Sadie Rios RN  Outcome: Progressing  Flowsheets (Taken 11/5/2024 1058)  Care Plan - Patient's Chronic Conditions and Co-Morbidity Symptoms are Monitored and Maintained or Improved:   Monitor and assess patient's chronic conditions and comorbid symptoms for stability, deterioration, or improvement   Collaborate with multidisciplinary team to address chronic and comorbid conditions and prevent exacerbation or deterioration   Update acute care plan with appropriate goals if chronic or comorbid symptoms are exacerbated and prevent overall improvement and discharge  11/5/2024 0244 by Magnolia Watkins, RN  Outcome: Progressing  Flowsheets (Taken 11/4/2024 2100)  Care Plan - Patient's Chronic Conditions and Co-Morbidity Symptoms are Monitored and Maintained or Improved: Monitor and assess patient's chronic conditions and comorbid symptoms for stability, deterioration, or improvement      Problem: Skin/Tissue Integrity  Goal: Absence of new skin breakdown  Description: 1.  Monitor for areas of redness and/or skin breakdown  2.  Assess vascular access sites hourly  3.  Every 4-6 hours minimum:  Change oxygen saturation probe site  4.  Every 4-6 hours:  If on nasal continuous positive airway pressure, respiratory therapy assess nares and determine need for appliance change or resting period.  11/5/2024 1058 by Sadie Rios RN  Outcome: Progressing  Note: No new areas of skin breakdown noted. Will monitor for changes.  11/5/2024 0244 by Magnolia Watkins RN  Outcome: Progressing     Problem: Safety - Adult  Goal: Free from fall injury  11/5/2024 1058 by Sadie Rios RN  Outcome: Progressing  Flowsheets (Taken 11/5/2024 1058)  Free From Fall Injury: Instruct family/caregiver on patient safety  11/5/2024 0244 by Magnolia Watkins RN  Outcome: Progressing     Problem: Pain  Goal: Verbalizes/displays adequate comfort level or baseline comfort level  11/5/2024 1058 by Sadie Rios RN  Outcome: Progressing  Flowsheets (Taken 11/5/2024 1058)  Verbalizes/displays adequate comfort level or baseline comfort level:   Encourage patient to monitor pain and request assistance   Administer analgesics based on type and severity of pain and evaluate response   Consider cultural and social influences on pain and pain management   Assess pain using appropriate pain scale   Implement non-pharmacological measures as appropriate and evaluate response   Notify Licensed Independent Practitioner if interventions unsuccessful or patient reports new pain  11/5/2024 0244 by Magnolia Watkins RN  Outcome: Progressing     Problem: Nutrition Deficit:  Goal: Optimize nutritional status  11/5/2024 1058 by Sadie Rios RN  Outcome: Progressing  Flowsheets (Taken 11/5/2024 1058)  Nutrient intake appropriate for improving, restoring, or maintaining nutritional needs:   Assess nutritional status and

## 2024-11-05 NOTE — PROGRESS NOTES
Occupational Therapy  Facility/Department: [address] [facility]HAB  Rehabilitation Occupational Therapy Daily Treatment Note    Date: 24  Patient Name: Sean Jackson       Room: N1Z-6972/3270-01  MRN: [id number]  Account: [id number]   : 1955  (69 y.o.) Gender: male           Past Medical History:  has a past medical history of Asthma, GERD (gastroesophageal reflux disease), Hyperlipidemia, and Hypertension.  Past Surgical History:   has a past surgical history that includes eye muscle surgery; Wrist surgery; and Appendectomy.    Restrictions  Restrictions/Precautions: Fall Risk  Other position/activity restrictions: JANI stockings    Subjective  Subjective: Patient supine in bed upon arrival to room. Patient agreeable to shower  Restrictions/Precautions: Fall Risk         Objective     Cognition  Overall Cognitive Status: Exceptions  Safety Judgement: Decreased awareness of need for assistance;Decreased awareness of need for safety  Orientation  Overall Orientation Status: Within Functional Limits         ADL  Feeding  Assistance Level: Independent  Grooming/Oral Hygiene  Assistance Level: Stand by assist  Skilled Clinical Factors: Stood at the sink to complete oral care, seated to comb hair  Upper Extremity Bathing  Assistance Level: Supervision  Skilled Clinical Factors: Seated on shower chair for upper body bathing, able to manage [facility]  Lower Extremity Bathing  Assistance Level: Stand by assist  Skilled Clinical Factors: Pt crossed his legs to wash his feet. Leans side to side on shower chair to wash buttocks  Upper Extremity Dressing  Assistance Level: Set-up  Skilled Clinical Factors: Pt doffed shirt seated on shower chair, seated in chair to don shirt  Lower Extremity Dressing  Assistance Level: Stand by assist  Skilled Clinical Factors: Crossed LE's to thread underclothing and pants over feet seated on edge of bed. SBA for balance for over hips  Putting On/Taking Off  Footwear  Assistance Level: Minimal assistance  Skilled Clinical Factors: Min A for kamryn bowden, SBA for shoes  Toileting  Assistance Level: Stand by assist  Skilled Clinical Factors: Patient with BM this am. Managed hygiene and clothing with SBA  Toilet Transfers  Equipment: Grab bars  Assistance Level: Stand by assist;Contact guard assist  Skilled Clinical Factors: Mobility from bed to commode with RW  Tub/Shower Transfers  Type: Shower  Transfer From: Rolling walker  Transfer To: Shower chair with back  Assistance Level: Contact guard assist;Stand by assist  Skilled Clinical Factors: Less impulsive this date, doffed lower body clothing seated on commode prior to standing to transfer to shower chair          Functional Mobility  Device: Rolling walker  Activity: To/From bathroom  Assistance Level: Contact guard assist;Stand by assist  Skilled Clinical Factors: Functional mobility with RW from bedroom to bathroom with slightly unsteady gait, no overt LOB noted with SBA/CGA  Supine to Sit  Assistance Level: Stand by assist  Sit to Stand  Assistance Level: Stand by assist  Stand to Sit  Assistance Level: Stand by assist         Assessment  Assessment  Assessment: Patient tolerated OT session well.  Functional mobility with RW from bedroom to bathroom with slightly unsteady gait, no overt LOB noted with SBA/CGA. Completed shower with SBA, seated on shower chair. Dressed upper body with set up. Lower body with SBA. Stood to complete oral care with SBA. Seated to comb hair. Patient is making progress towards goals and will benefit from continued skilled OT to maximize IND.  Activity Tolerance: Patient tolerated treatment well  Discharge Recommendations: 24 hour supervision or assist;Home with Home health OT;Patient would benefit from continued therapy after discharge;2-3 sessions per week  Safety Devices  Safety Devices in place: Yes  Type of devices: Call light within reach;Chair alarm in place;Left in chair;Gait

## 2024-11-05 NOTE — PROGRESS NOTES
Pt sitting up in chair throughout shift. He has had no c/o pain. Ambulating with walker, gait belt, and SBA, tolerating well. Air cast in place to left ankle, as ordered. Continue care.

## 2024-11-05 NOTE — CARE COORDINATION
SW attempted to meet with patient, however, he was away at therapy. MIRIAN will try again laer if time permits.     Respectfully submitted,    Airam ANDERSON, KENDRICK  [facility]   [phone]    Electronically signed by JUSTIN Finley, LSW on 11/6/2024 at 9:45 AM

## 2024-11-05 NOTE — PROGRESS NOTES
Physical Therapy  Facility/Department: [address] [facility]  Rehabilitation Physical Therapy Treatment Note    NAME: Sean Jackson  : 1955 (69 y.o.)  MRN: [id number]  CODE STATUS: Full Code    Date of Service: 24       Restrictions:  Restrictions/Precautions: Fall Risk  Position Activity Restriction  Other position/activity restrictions: JANI stockings     SUBJECTIVE  Subjective  Subjective: Pt reports he is doing fine this AM  Pain: denies pain          OBJECTIVE  Cognition  Overall Cognitive Status: Exceptions  Safety Judgement: Decreased awareness of need for assistance;Decreased awareness of need for safety  Orientation  Overall Orientation Status: Within Functional Limits    Functional Mobility  Sit to Supine  Assistance Level: Stand by assist  Supine to Sit  Assistance Level: Stand by assist  Transfers  Surface: Wheelchair;To mat;From mat  Additional Factors: Hand placement cues;Set-up  Device: Walker  Sit to Stand  Assistance Level: Contact guard assist;Stand by assist  Skilled Clinical Factors: slight impulsive, Min A for lower chair surface  Stand to Sit  Assistance Level: Contact guard assist;Stand by assist  Sit <> stand with CGA/SBA      Environmental Mobility  Ambulation  Surface: Level surface;Carpet  Device: Rolling walker  Distance: 200', 136, 68' x1  Activity: Within Unit  Additional Factors: Increased time to complete;Verbal cues  Assistance Level: Contact guard assist;Stand by assist  Gait Deviations: Slow krish;Decreased step length left;Decreased heel strike left;Unsteady gait  Skilled Clinical Factors: decreased push off/foot drop LLE, lean towards Left side, variable path during turns around obstacles/in the room  Amb 150' x2 outside on therapy gym patio with RW and CGA/min A  Curb x1 outside on therapy gym patio with CGA and RW  3 stairs x1 outside on therapy gym patio with CGA and unilat HR           PT Exercises  Exercise Treatment: Sitting: marching, BTB hamstring curls x10  curb and 3 steps with CGA. Pt is showing improvements in activity tolerance, however it still impulsive and would benefit from continued skilled PT to improve strength, endurance, and functional mobility in order to return home.     Goals  Patient Goals   Patient Goals : to strengthen LLE  Short Term Goals  Time Frame for Short Term Goals: 1 week  Short Term Goal 1: Bed mobility with S  Short Term Goal 2: Curb with SBA x1  Short Term Goal 3: 4 steps with SBA x1 and BHR  Short Term Goal 4: Amb 150' with SBA and RW  Short Term Goal 5: Transfer with SBA  Long Term Goals  Time Frame for Long Term Goals : 2 week  Long Term Goal 1: Bed mobility with I  Long Term Goal 2: Curb with MI  Long Term Goal 3: 4 steps with MI and BHR  Long Term Goal 4: Amb 200' with MI and RW  Long Term Goal 5: Transfer with I    PLAN OF CARE/SAFETY  Physical Therapy Plan  General Plan:  minutes of therapy at least 5 out of 7 days a week  Days Per Week: 5 Days  Hours Per Day: 1.5 hours  Therapy Duration: 2 Weeks  Current Treatment Recommendations: Strengthening;ROM;Balance training;Functional mobility training;Transfer training;Endurance training;Gait training;Stair training;Neuromuscular re-education;Home exercise program;Safety education & training;Patient/Caregiver education & training;Therapeutic activities    EDUCATION  Education  Education Given To: Patient  Education Provided: Role of Therapy;Plan of Care;ADL Function;Safety;Transfer Training;Energy Conservation;Fall Prevention Strategies;Precautions;Mobility Training;Equipment  Education Provided Comments: purpose of e-stim  Education Method: Demonstration;Verbal  Barriers to Learning: Cognition  Education Outcome: Verbalized understanding;Demonstrated understanding        Therapy Time   Individual Concurrent Group Co-treatment   Time In 0900         Time Out 0945         Minutes 45           Timed Code Treatment Minutes: 45 Minutes    Second Session Therapy Time     Individual

## 2024-11-05 NOTE — PLAN OF CARE
Problem: Chronic Conditions and Co-morbidities  Goal: Patient's chronic conditions and co-morbidity symptoms are monitored and maintained or improved  Outcome: Progressing  Flowsheets (Taken 11/4/2024 2100)  Care Plan - Patient's Chronic Conditions and Co-Morbidity Symptoms are Monitored and Maintained or Improved: Monitor and assess patient's chronic conditions and comorbid symptoms for stability, deterioration, or improvement     Problem: Skin/Tissue Integrity  Goal: Absence of new skin breakdown  Description: 1.  Monitor for areas of redness and/or skin breakdown  2.  Assess vascular access sites hourly  3.  Every 4-6 hours minimum:  Change oxygen saturation probe site  4.  Every 4-6 hours:  If on nasal continuous positive airway pressure, respiratory therapy assess nares and determine need for appliance change or resting period.  Outcome: Progressing     Problem: Safety - Adult  Goal: Free from fall injury  Outcome: Progressing     Problem: Pain  Goal: Verbalizes/displays adequate comfort level or baseline comfort level  Outcome: Progressing     Problem: ABCDS Injury Assessment  Goal: Absence of physical injury  Outcome: Progressing  Flowsheets (Taken 11/5/2024 0241)  Absence of Physical Injury: Implement safety measures based on patient assessment

## 2024-11-05 NOTE — PROGRESS NOTES
Occupational Therapy  OCCUPATIONAL THERAPY  Progress Note   Second Session    Patient Name: Sean Jackson  Medical Record Number: [id number]    Treatment Diagnosis: Impaired functional mobility and ADLs    General  Chart Reviewed: Yes  Patient assessed for rehabilitation services?: Yes  Additional Pertinent Hx: Per H&P \"Patient is a 69-year-old male who developed a sudden onset of left arm and leg weakness, ataxia, and dysarthria.  Patient was brought to the hospital where MRI scan of the brain revealed a small acute infarction of the right thalamus.  Patient known to have history of hypertension, diabetes type 2, COPD, and increased lipids.  Patient seen by neurology and started on aspirin and Plavix.  Patient started in therapies, and needs min assist for transfers and gait of 40 feet due to his weakness and ataxia.  Patient needs min to mod assist for lower body bathing and dressing activities.  Patient lives with a roommate in a mobile home and was independent prior to admission.  He states his son and daughter-in-law lives next-door.\"  Family / Caregiver Present: No  Referring Practitioner: Beryl Greenberg MD  Diagnosis: acute ischemic stroke     Restrictions/Precautions  Restrictions/Precautions: Fall Risk        Position Activity Restriction  Other position/activity restrictions: JANI stockings    Subjective: Pt met in therapy gym, reported no pain, agreeable to OT tx    Objective: to address strength for ADLs, address meal prep for safety at home    Assessment: Pt completed BUE HEP w/ green band 3x10 sets w/ cues for appropriate technique. Pt then completed Part A and B of trail making test. He took 55s to complete part A (longer than the average), suggesting decreased cognitive processing speed. It took the pt 4 min 40s to complete part B, scoring as deficient in his executive processing. Results discussed w/ pt and concerns for driving. Pt open to OP drivers eval. Per OT Tonya, this concern was  brought to Dr Greenberg's attention and they will put in an order for an evaluation.     Pt able to complete simple meal prep (frying an egg) w/ SBA throughout. No concerns w/ managing stove top, used a basket to transport items. Pt open to having a basket for his walker, but still questioning if he would like one or not. Pt left at therapy gym entrance to be transported back to his room by volunteer. Cont w/ POC.       Safety Device - Type of devices:  []  All fall risk precautions in place [] Bed alarm in place  [] Call light within reach [] Chair alarm in place [] Positioning belt [] Gait belt [] Patient at risk for falls [] Left in bed [] Left in chair [] Telesitter in use [] Sitter present [] Nurse notified []  None      Therapy Time   Individual Co-treatment   Time In 1315     Time Out 1400     Minutes 45       Electronically signed by JERROD Kenney/L [id number] on 11/5/2024 at 1:16 PM

## 2024-11-05 NOTE — PROGRESS NOTES
Pt sitting up in chair after working with OT. He denies having any pain, nausea, numbness, or tingling. Pulses palpable, skin warm to touch. Pt's  strength and flexion on left side moderate and strong on right side. Fall precautions in place, belongings within reach. Will continue to monitor and assess.

## 2024-11-06 LAB
GLUCOSE BLD-MCNC: 129 MG/DL (ref 70–99)
GLUCOSE BLD-MCNC: 138 MG/DL (ref 70–99)
GLUCOSE BLD-MCNC: 160 MG/DL (ref 70–99)
GLUCOSE BLD-MCNC: 97 MG/DL (ref 70–99)
PERFORMED ON: ABNORMAL
PERFORMED ON: NORMAL

## 2024-11-06 PROCEDURE — 97530 THERAPEUTIC ACTIVITIES: CPT

## 2024-11-06 PROCEDURE — 94760 N-INVAS EAR/PLS OXIMETRY 1: CPT

## 2024-11-06 PROCEDURE — 97535 SELF CARE MNGMENT TRAINING: CPT

## 2024-11-06 PROCEDURE — 6370000000 HC RX 637 (ALT 250 FOR IP): Performed by: PHYSICAL MEDICINE & REHABILITATION

## 2024-11-06 PROCEDURE — 97110 THERAPEUTIC EXERCISES: CPT

## 2024-11-06 PROCEDURE — 6360000002 HC RX W HCPCS: Performed by: PHYSICAL MEDICINE & REHABILITATION

## 2024-11-06 PROCEDURE — 97530 THERAPEUTIC ACTIVITIES: CPT | Performed by: PHYSICAL THERAPIST

## 2024-11-06 PROCEDURE — 97110 THERAPEUTIC EXERCISES: CPT | Performed by: PHYSICAL THERAPIST

## 2024-11-06 PROCEDURE — 97116 GAIT TRAINING THERAPY: CPT | Performed by: PHYSICAL THERAPIST

## 2024-11-06 PROCEDURE — 97112 NEUROMUSCULAR REEDUCATION: CPT

## 2024-11-06 PROCEDURE — 1280000000 HC REHAB R&B

## 2024-11-06 PROCEDURE — 94640 AIRWAY INHALATION TREATMENT: CPT

## 2024-11-06 RX ADMIN — CLOPIDOGREL BISULFATE 75 MG: 75 TABLET ORAL at 08:21

## 2024-11-06 RX ADMIN — ATORVASTATIN CALCIUM 10 MG: 10 TABLET, FILM COATED ORAL at 08:21

## 2024-11-06 RX ADMIN — METFORMIN HYDROCHLORIDE 500 MG: 500 TABLET, EXTENDED RELEASE ORAL at 08:21

## 2024-11-06 RX ADMIN — HYDROCHLOROTHIAZIDE 25 MG: 25 TABLET ORAL at 08:20

## 2024-11-06 RX ADMIN — LOSARTAN POTASSIUM 100 MG: 100 TABLET, FILM COATED ORAL at 08:20

## 2024-11-06 RX ADMIN — Medication 400 MG: at 20:47

## 2024-11-06 RX ADMIN — BUDESONIDE AND FORMOTEROL FUMARATE DIHYDRATE 2 PUFF: 160; 4.5 AEROSOL RESPIRATORY (INHALATION) at 07:44

## 2024-11-06 RX ADMIN — BUDESONIDE AND FORMOTEROL FUMARATE DIHYDRATE 2 PUFF: 160; 4.5 AEROSOL RESPIRATORY (INHALATION) at 20:55

## 2024-11-06 RX ADMIN — Medication 400 MG: at 08:21

## 2024-11-06 RX ADMIN — ENOXAPARIN SODIUM 40 MG: 100 INJECTION SUBCUTANEOUS at 08:23

## 2024-11-06 RX ADMIN — PANTOPRAZOLE SODIUM 40 MG: 40 TABLET, DELAYED RELEASE ORAL at 07:47

## 2024-11-06 RX ADMIN — ANTI-FUNGAL POWDER MICONAZOLE NITRATE TALC FREE: 1.42 POWDER TOPICAL at 08:25

## 2024-11-06 RX ADMIN — ASPIRIN 81 MG 81 MG: 81 TABLET ORAL at 08:20

## 2024-11-06 ASSESSMENT — 9 HOLE PEG TEST
TESTTIME_SECONDS: 44
TESTTIME_SECONDS: 23

## 2024-11-06 ASSESSMENT — PAIN SCALES - WONG BAKER: WONGBAKER_NUMERICALRESPONSE: NO HURT

## 2024-11-06 NOTE — PROGRESS NOTES
strength, activity tolerance, and performance with reciprocal patterns required for ambulation. Pt able to maintain balance while performing dynamic standing balance activities on floor and foam surface. Pt only required CGA with an occasional min A episode while on foam surface. Pt demonstrates increased ankle strategy on foam surface. Pt is still performing below baseline and would benefit from continued skilled PT to improve strength, endurance, and independence with functional mobility to return home.     Goals  Patient Goals   Patient Goals : to strengthen LLE  Short Term Goals  Time Frame for Short Term Goals: 1 week  Short Term Goal 1: Bed mobility with S  Short Term Goal 2: Curb with SBA x1  Short Term Goal 3: 4 steps with SBA x1 and BHR  Short Term Goal 4: Amb 150' with SBA and RW  Short Term Goal 5: Transfer with SBA  Long Term Goals  Time Frame for Long Term Goals : 2 week  Long Term Goal 1: Bed mobility with I  Long Term Goal 2: Curb with MI  Long Term Goal 3: 4 steps with MI and BHR  Long Term Goal 4: Amb 200' with MI and RW  Long Term Goal 5: Transfer with I    PLAN OF CARE/SAFETY  Physical Therapy Plan  General Plan:  minutes of therapy at least 5 out of 7 days a week  Days Per Week: 5 Days  Hours Per Day: 1.5 hours  Therapy Duration: 2 Weeks  Current Treatment Recommendations: Strengthening;ROM;Balance training;Functional mobility training;Transfer training;Endurance training;Gait training;Stair training;Neuromuscular re-education;Home exercise program;Safety education & training;Patient/Caregiver education & training;Therapeutic activities  Safety Devices  Type of Devices: All fall risk precautions in place;Gait belt;Left in chair  Restraints  Restraints Initially in Place: No    EDUCATION  Education  Education Given To: Patient  Education Provided: Role of Therapy;Plan of Care;ADL Function;Safety;Transfer Training;Energy Conservation;Fall Prevention Strategies;Precautions;Mobility  Training;Equipment  Education Method: Demonstration;Verbal  Barriers to Learning: Cognition  Education Outcome: Verbalized understanding;Demonstrated understanding        Therapy Time   Individual Concurrent Group Co-treatment   Time In 0900         Time Out 0945         Minutes 45           Timed Code Treatment Minutes: 45 Minutes    Second Session Therapy Time     Individual Co-treatment   Time In 1445     Time Out 1530     Minutes 45            Therapist was present, directed the patient's care, made skilled judgement, and was responsible for assessment and treatment of the patient.       LATASHA Sood, 11/06/24 at 3:08 PM   Electronically signed by LATASHA Sood on 11/6/24 at 9:56 AM EST   Electronically signed by ARMANDO PARR PT on 11/6/24 at 4:29 PM EST

## 2024-11-06 NOTE — PROGRESS NOTES
Patient admitted to rehab with CVA.  A/Ox4. Transfers with walker x1. Mobility restrictions: WBAT. On Cardiac diet, tolerating well. Medications taken whole with thins. On Plavix, Lovenox for DVT prophylaxis.  Skin: scattered patches of psoriasis, redness to groin, healing tear to gluteal slit. Oxygen: RA. LDA: none. Has been continent of bowel and of bladder. LBM 11-5. Chair/bed alarms in use and call light in reach. Will monitor for safety.

## 2024-11-06 NOTE — PROGRESS NOTES
Department of Physical Medicine & Rehabilitation  Progress Note    Patient Identification:  Sean Jackson  [id number]  : 1955  Admit date: 10/31/2024    Chief Complaint: Acute ischemic stroke (HCC)    Subjective:   No acute events overnight.   Patient seen this afternoon working with OT. Has not had any ankle \"rolling\" since starting use of air cast. He asks for additional dietary education.   Labs reviewed.     ROS: No f/c, n/v, cp     Objective:  Patient Vitals for the past 24 hrs:   BP Temp Temp src Pulse Resp SpO2 Weight   24 0800 -- -- -- -- -- 95 % --   24 0747 (!) 137/91 97.4 °F (36.3 °C) Oral 69 18 90 % --   24 0730 (!) 137/91 97.4 °F (36.3 °C) Oral 69 18 90 % --   24 -- -- -- -- -- -- 85.1 kg (187 lb 9.8 oz)   24 (!) 144/88 97.8 °F (36.6 °C) Oral 88 18 91 % --   24 -- -- -- -- -- 93 % --     Const: Alert. No distress, pleasant.   HEENT: Normocephalic, atraumatic. Normal sclera/conjunctiva. MMM.   CV: Regular rate and rhythm.   Resp: No respiratory distress. Lungs CTAB.   Abd: Soft, nontender, nondistended, NABS+   Ext: No edema.   Neuro: Alert, oriented, appropriately interactive. Left hemiparesis (LUE 4+/5, LLE 4/5 except 1/5 left ankle DF and PF). Decreased coordination left hemibody  Psych: Cooperative, appropriate mood and affect    Laboratory data: Available via EMR.   Last 24 hour lab  Recent Results (from the past 24 hour(s))   POCT Glucose    Collection Time: 24 10:55 AM   Result Value Ref Range    POC Glucose 118 (H) 70 - 99 mg/dl    Performed on ACCU-CHEK    POCT Glucose    Collection Time: 24  4:08 PM   Result Value Ref Range    POC Glucose 115 (H) 70 - 99 mg/dl    Performed on ACCU-CHEK    POCT Glucose    Collection Time: 24  8:26 PM   Result Value Ref Range    POC Glucose 160 (H) 70 - 99 mg/dl    Performed on ACCU-CHEK    POCT Glucose    Collection Time: 24  7:07 AM   Result Value Ref Range    POC Glucose 129

## 2024-11-06 NOTE — PROGRESS NOTES
Occupational Therapy  Facility/Department: [address] RE[facility]AB  Rehabilitation Occupational Therapy Daily AM and PM Treatment Note    Date: 24  Patient Name: Sean Jackson       Room: B2L-8753/3270-01  MRN: [id number]  Account: [id number]   : 1955  (69 y.o.) Gender: male         Past Medical History:  has a past medical history of Asthma, GERD (gastroesophageal reflux disease), Hyperlipidemia, and Hypertension.  Past Surgical History:   has a past surgical history that includes eye muscle surgery; Wrist surgery; and Appendectomy.    Restrictions  Restrictions/Precautions: Fall Risk  Other position/activity restrictions: JANI stockings    Subjective  Subjective: Pt met in dept, reports slept fine and no pain. Agreeable to OT treat.  Restrictions/Precautions: Fall Risk             Objective     Cognition  Overall Cognitive Status: Exceptions  Safety Judgement: Decreased awareness of need for assistance;Decreased awareness of need for safety  Orientation  Overall Orientation Status: Within Functional Limits         ADL  Toilet Transfers  Assistance Level: Contact guard assist;Stand by assist  Skilled Clinical Factors: Pt sat to toilet without arm support CGA, he stood from toilet without arm support SBA. Discussed use of TSF, pt believes he doesn't need and will do fine at home. Discussed recommendation for TSF to improve stability.  Tub/Shower Transfers  Type: Tub  Transfer From: Rolling walker  Transfer To: Tub transfer bench  Additional Factors: Cues for hand placement  Assistance Level: Stand by assist  Skilled Clinical Factors: DRY shower tub transfer with SBA, cues for hand placement sitting down and efffort to swing LEs >< ledge. Pt stood in shower with grab bar and R handle on TTB SBA. He stood from TTB with poor hand placement and slightly impulsive SBA.          Functional Mobility  Device: Rolling walker  Activity: To/From bathroom  Assistance Level: Stand by assist  Skilled Clinical Factors:

## 2024-11-06 NOTE — PROGRESS NOTES
Assessment complete. Vital signs stable. Plan of care for the day reviewed with patient, all questions answered. No needs expressed at this time. All belongings within reach, call light within reach, bed in the lowest position, non slip socks on when out of bed. Will continue to monitor.  Psoriasis noted to left arm & generalized throughout body, silcone lotion applied and effective.

## 2024-11-06 NOTE — PLAN OF CARE
Problem: Discharge Planning  Goal: Discharge to home or other facility with appropriate resources  11/6/2024 1034 by Meena Decker LPN  Outcome: Progressing  Flowsheets (Taken 11/6/2024 1034)  Discharge to home or other facility with appropriate resources: Identify barriers to discharge with patient and caregiver     Problem: Chronic Conditions and Co-morbidities  Goal: Patient's chronic conditions and co-morbidity symptoms are monitored and maintained or improved  11/6/2024 1034 by Meena Decker LPN  Outcome: Progressing  Flowsheets (Taken 11/6/2024 1034)  Care Plan - Patient's Chronic Conditions and Co-Morbidity Symptoms are Monitored and Maintained or Improved: Monitor and assess patient's chronic conditions and comorbid symptoms for stability, deterioration, or improvement     Problem: Skin/Tissue Integrity  Goal: Absence of new skin breakdown  Description: 1.  Monitor for areas of redness and/or skin breakdown  2.  Assess vascular access sites hourly  3.  Every 4-6 hours minimum:  Change oxygen saturation probe site  4.  Every 4-6 hours:  If on nasal continuous positive airway pressure, respiratory therapy assess nares and determine need for appliance change or resting period.  11/6/2024 1034 by Meena Decker LPN  Outcome: Progressing     Problem: Safety - Adult  Goal: Free from fall injury  11/6/2024 1034 by Meena Decker LPN  Outcome: Progressing  Flowsheets (Taken 11/6/2024 1034)  Free From Fall Injury: Instruct family/caregiver on patient safety     Problem: Pain  Goal: Verbalizes/displays adequate comfort level or baseline comfort level  11/6/2024 1034 by Meena Decker LPN  Outcome: Progressing  Flowsheets (Taken 11/6/2024 1034)  Verbalizes/displays adequate comfort level or baseline comfort level:   Encourage patient to monitor pain and request assistance   Assess pain using appropriate pain scale     Problem: Nutrition Deficit:  Goal: Optimize nutritional status  11/6/2024 1034 by Meena Decker  LPN  Outcome: Progressing  Flowsheets (Taken 11/6/2024 1034)  Nutrient intake appropriate for improving, restoring, or maintaining nutritional needs: Monitor oral intake, labs, and treatment plans     Problem: ABCDS Injury Assessment  Goal: Absence of physical injury  11/6/2024 1034 by Meena Decker LPN  Outcome: Progressing  Flowsheets (Taken 11/6/2024 1034)  Absence of Physical Injury: Implement safety measures based on patient assessment     Problem: Respiratory - Adult  Goal: Achieves optimal ventilation and oxygenation  11/6/2024 1034 by Meena Decker LPN  Outcome: Progressing  Flowsheets (Taken 11/6/2024 1034)  Achieves optimal ventilation and oxygenation: Assess for changes in respiratory status     Problem: Skin/Tissue Integrity - Adult  Goal: Skin integrity remains intact  11/6/2024 1034 by Meena Decker LPN  Outcome: Progressing  Flowsheets (Taken 11/6/2024 1034)  Skin Integrity Remains Intact: Monitor for areas of redness and/or skin breakdown     Problem: Musculoskeletal - Adult  Goal: Return mobility to safest level of function  11/6/2024 1034 by Meena Decker LPN  Outcome: Progressing  Flowsheets (Taken 11/6/2024 1034)  Return Mobility to Safest Level of Function:   Assess patient stability and activity tolerance for standing, transferring and ambulating with or without assistive devices   Assist with transfers and ambulation using safe patient handling equipment as needed

## 2024-11-06 NOTE — PROGRESS NOTES
Occupational Therapy  OCCUPATIONAL THERAPY  Progress Note   Second Session    Patient Name: Sean Jackson  Medical Record Number: [id number]    Treatment Diagnosis: Impaired functional mobility    General  Chart Reviewed: Yes  Patient assessed for rehabilitation services?: Yes  Additional Pertinent Hx: Per H&P \"Patient is a 69-year-old male who developed a sudden onset of left arm and leg weakness, ataxia, and dysarthria.  Patient was brought to the hospital where MRI scan of the brain revealed a small acute infarction of the right thalamus.  Patient known to have history of hypertension, diabetes type 2, COPD, and increased lipids.  Patient seen by neurology and started on aspirin and Plavix.  Patient started in therapies, and needs min assist for transfers and gait of 40 feet due to his weakness and ataxia.  Patient needs min to mod assist for lower body bathing and dressing activities.  Patient lives with a roommate in a mobile home and was independent prior to admission.  He states his son and daughter-in-law lives next-door.\"  Family / Caregiver Present: No  Referring Practitioner: Beryl Greenberg MD  Diagnosis: acute ischemic stroke     Restrictions/Precautions  Restrictions/Precautions: Fall Risk        Position Activity Restriction  Other position/activity restrictions: KAMRYN stockings    Subjective: Patient met in therapy department. Agreeable to OT.     Objective: Sit<>stand from/to RW with SBA with cues for hand placement. Functional mobility with RW in therapy department with SBA, slightly quick, gait, no overt LOB noted. Transfer to regular bed with SBA and cues for hand placement. Sit<>supine on regular bed in therapy department with Supervision. Seated on edge of bed to doff/don kamryn hose, left air cast and shoes with SBA, increased time and effort, cues for purse lip breathing and energy conservation. Seated to complete fine motor coordination tasks to increase IND with ADL tasks.      Assessment:  Patient tolerated session well this pm. Making good progress towards goals. Patient would benefit from continued skilled OT to maximize IND.    Safety Device - Type of devices:  []  All fall risk precautions in place [] Bed alarm in place  [] Call light within reach [] Chair alarm in place [] Positioning belt [x] Gait belt [] Patient at risk for falls [] Left in bed [] Left in chair [] Telesitter in use [] Sitter present [] Nurse notified []  None      Therapy Time   Individual Co-treatment   Time In 1400     Time Out 1445     Minutes 45       Electronically signed by Mary Fisher [id number] on 11/6/2024 at 2:35 PM    OTR was consulted with this patients treatment/intervention plan.

## 2024-11-06 NOTE — PLAN OF CARE
Problem: Discharge Planning  Goal: Discharge to home or other facility with appropriate resources  11/5/2024 2254 by Gisselle Steward RN  Outcome: Progressing  Flowsheets (Taken 11/5/2024 2028)  Discharge to home or other facility with appropriate resources: Identify barriers to discharge with patient and caregiver  11/5/2024 1058 by Sadie Rios RN  Outcome: Progressing  Flowsheets (Taken 11/5/2024 1058)  Discharge to home or other facility with appropriate resources:   Identify barriers to discharge with patient and caregiver   Identify discharge learning needs (meds, wound care, etc)   Refer to discharge planning if patient needs post-hospital services based on physician order or complex needs related to functional status, cognitive ability or social support system   Arrange for needed discharge resources and transportation as appropriate     Problem: Chronic Conditions and Co-morbidities  Goal: Patient's chronic conditions and co-morbidity symptoms are monitored and maintained or improved  11/5/2024 2254 by Gisselle Steward RN  Outcome: Progressing  Flowsheets (Taken 11/5/2024 2028)  Care Plan - Patient's Chronic Conditions and Co-Morbidity Symptoms are Monitored and Maintained or Improved: Monitor and assess patient's chronic conditions and comorbid symptoms for stability, deterioration, or improvement  11/5/2024 1058 by Sadie Rios RN  Outcome: Progressing  Flowsheets (Taken 11/5/2024 1058)  Care Plan - Patient's Chronic Conditions and Co-Morbidity Symptoms are Monitored and Maintained or Improved:   Monitor and assess patient's chronic conditions and comorbid symptoms for stability, deterioration, or improvement   Collaborate with multidisciplinary team to address chronic and comorbid conditions and prevent exacerbation or deterioration   Update acute care plan with appropriate goals if chronic or comorbid symptoms are exacerbated and prevent overall improvement and discharge     Problem: Skin/Tissue  Integrity  Goal: Absence of new skin breakdown  Description: 1.  Monitor for areas of redness and/or skin breakdown  2.  Assess vascular access sites hourly  3.  Every 4-6 hours minimum:  Change oxygen saturation probe site  4.  Every 4-6 hours:  If on nasal continuous positive airway pressure, respiratory therapy assess nares and determine need for appliance change or resting period.  11/5/2024 2254 by Gisselle Steward RN  Outcome: Progressing  Note: Assist pt. With turning as needed.  11/5/2024 1058 by Sadie Rios RN  Outcome: Progressing  Note: No new areas of skin breakdown noted. Will monitor for changes.     Problem: Safety - Adult  Goal: Free from fall injury  11/5/2024 2254 by Gisselle Steward RN  Outcome: Progressing  Flowsheets (Taken 11/5/2024 2254)  Free From Fall Injury: Instruct family/caregiver on patient safety  11/5/2024 1058 by Sadie Rios RN  Outcome: Progressing  Flowsheets (Taken 11/5/2024 1058)  Free From Fall Injury: Instruct family/caregiver on patient safety     Problem: Pain  Goal: Verbalizes/displays adequate comfort level or baseline comfort level  11/5/2024 2254 by Gisselle Steward RN  Outcome: Progressing  Flowsheets (Taken 11/5/2024 2015)  Verbalizes/displays adequate comfort level or baseline comfort level: Assess pain using appropriate pain scale  11/5/2024 1058 by Sadie Rios RN  Outcome: Progressing  Flowsheets (Taken 11/5/2024 1058)  Verbalizes/displays adequate comfort level or baseline comfort level:   Encourage patient to monitor pain and request assistance   Administer analgesics based on type and severity of pain and evaluate response   Consider cultural and social influences on pain and pain management   Assess pain using appropriate pain scale   Implement non-pharmacological measures as appropriate and evaluate response   Notify Licensed Independent Practitioner if interventions unsuccessful or patient reports new pain     Problem: Nutrition Deficit:  Goal: Optimize

## 2024-11-06 NOTE — PROGRESS NOTES
Department of Physical Medicine & Rehabilitation  Progress Note    Patient Identification:  Sean Jackson  [id number]  : 1955  Admit date: 10/31/2024    Chief Complaint: Acute ischemic stroke (HCC)    Subjective:   No acute events overnight.   Patient seen this afternoon sitting up in room. Reports ongoing progress with therapies. Pt reporting patient having intermittent left ankle rolling due to weakness. Air cast ordered. We discussed dc planning and he is in agreement.   Labs reviewed.     ROS: No f/c, n/v, cp     Objective:  Patient Vitals for the past 24 hrs:   BP Temp Temp src Pulse Resp SpO2 Weight   24 (!) 144/88 97.8 °F (36.6 °C) Oral 88 18 91 % --   24 -- -- -- -- -- 93 % --   24 0842 (!) 147/87 98.5 °F (36.9 °C) Oral 71 16 90 % --   24 0818 -- -- -- -- -- 94 % --   24 0500 -- -- -- -- -- -- 89.3 kg (196 lb 13.9 oz)     Const: Alert. No distress, pleasant.   HEENT: Normocephalic, atraumatic. Normal sclera/conjunctiva. MMM.   CV: Regular rate and rhythm.   Resp: No respiratory distress. Lungs CTAB.   Abd: Soft, nontender, nondistended, NABS+   Ext: No edema.   Neuro: Alert, oriented, appropriately interactive. Left hemiparesis (LUE 4+/5, LLE 4/5 except 1/5 left ankle DF and PF). Decreased coordination left hemibody  Psych: Cooperative, appropriate mood and affect    Laboratory data: Available via EMR.   Last 24 hour lab  Recent Results (from the past 24 hour(s))   POCT Glucose    Collection Time: 24  6:50 AM   Result Value Ref Range    POC Glucose 117 (H) 70 - 99 mg/dl    Performed on ACCU-CHEK    POCT Glucose    Collection Time: 24 10:55 AM   Result Value Ref Range    POC Glucose 118 (H) 70 - 99 mg/dl    Performed on ACCU-CHEK    POCT Glucose    Collection Time: 24  4:08 PM   Result Value Ref Range    POC Glucose 115 (H) 70 - 99 mg/dl    Performed on ACCU-CHEK    POCT Glucose    Collection Time: 24  8:26 PM   Result Value Ref Range

## 2024-11-07 LAB
ANION GAP SERPL CALCULATED.3IONS-SCNC: 9 MMOL/L (ref 3–16)
BASOPHILS # BLD: 0 K/UL (ref 0–0.2)
BASOPHILS NFR BLD: 0.9 %
BUN SERPL-MCNC: 11 MG/DL (ref 7–20)
CALCIUM SERPL-MCNC: 8.9 MG/DL (ref 8.3–10.6)
CHLORIDE SERPL-SCNC: 92 MMOL/L (ref 99–110)
CO2 SERPL-SCNC: 29 MMOL/L (ref 21–32)
CREAT SERPL-MCNC: 0.8 MG/DL (ref 0.8–1.3)
DEPRECATED RDW RBC AUTO: 14 % (ref 12.4–15.4)
EOSINOPHIL # BLD: 0.3 K/UL (ref 0–0.6)
EOSINOPHIL NFR BLD: 6 %
GFR SERPLBLD CREATININE-BSD FMLA CKD-EPI: >90 ML/MIN/{1.73_M2}
GLUCOSE BLD-MCNC: 116 MG/DL (ref 70–99)
GLUCOSE BLD-MCNC: 121 MG/DL (ref 70–99)
GLUCOSE BLD-MCNC: 140 MG/DL (ref 70–99)
GLUCOSE BLD-MCNC: 97 MG/DL (ref 70–99)
GLUCOSE SERPL-MCNC: 104 MG/DL (ref 70–99)
HCT VFR BLD AUTO: 42.6 % (ref 40.5–52.5)
HGB BLD-MCNC: 14.3 G/DL (ref 13.5–17.5)
LYMPHOCYTES # BLD: 1.4 K/UL (ref 1–5.1)
LYMPHOCYTES NFR BLD: 26.9 %
MAGNESIUM SERPL-MCNC: 1.88 MG/DL (ref 1.8–2.4)
MCH RBC QN AUTO: 26.9 PG (ref 26–34)
MCHC RBC AUTO-ENTMCNC: 33.6 G/DL (ref 31–36)
MCV RBC AUTO: 80.2 FL (ref 80–100)
MONOCYTES # BLD: 0.7 K/UL (ref 0–1.3)
MONOCYTES NFR BLD: 12.9 %
NEUTROPHILS # BLD: 2.9 K/UL (ref 1.7–7.7)
NEUTROPHILS NFR BLD: 53.3 %
PERFORMED ON: ABNORMAL
PERFORMED ON: NORMAL
PLATELET # BLD AUTO: 222 K/UL (ref 135–450)
PMV BLD AUTO: 7.9 FL (ref 5–10.5)
POTASSIUM SERPL-SCNC: 3.7 MMOL/L (ref 3.5–5.1)
RBC # BLD AUTO: 5.31 M/UL (ref 4.2–5.9)
SODIUM SERPL-SCNC: 130 MMOL/L (ref 136–145)
WBC # BLD AUTO: 5.3 K/UL (ref 4–11)

## 2024-11-07 PROCEDURE — 1280000000 HC REHAB R&B

## 2024-11-07 PROCEDURE — 80048 BASIC METABOLIC PNL TOTAL CA: CPT

## 2024-11-07 PROCEDURE — 97530 THERAPEUTIC ACTIVITIES: CPT

## 2024-11-07 PROCEDURE — 97110 THERAPEUTIC EXERCISES: CPT | Performed by: PHYSICAL THERAPIST

## 2024-11-07 PROCEDURE — 6370000000 HC RX 637 (ALT 250 FOR IP): Performed by: PHYSICAL MEDICINE & REHABILITATION

## 2024-11-07 PROCEDURE — 83735 ASSAY OF MAGNESIUM: CPT

## 2024-11-07 PROCEDURE — 97116 GAIT TRAINING THERAPY: CPT | Performed by: PHYSICAL THERAPIST

## 2024-11-07 PROCEDURE — 94640 AIRWAY INHALATION TREATMENT: CPT

## 2024-11-07 PROCEDURE — 97535 SELF CARE MNGMENT TRAINING: CPT

## 2024-11-07 PROCEDURE — 97110 THERAPEUTIC EXERCISES: CPT

## 2024-11-07 PROCEDURE — 6360000002 HC RX W HCPCS: Performed by: PHYSICAL MEDICINE & REHABILITATION

## 2024-11-07 PROCEDURE — 94760 N-INVAS EAR/PLS OXIMETRY 1: CPT

## 2024-11-07 PROCEDURE — 85025 COMPLETE CBC W/AUTO DIFF WBC: CPT

## 2024-11-07 PROCEDURE — 36415 COLL VENOUS BLD VENIPUNCTURE: CPT

## 2024-11-07 PROCEDURE — 97530 THERAPEUTIC ACTIVITIES: CPT | Performed by: PHYSICAL THERAPIST

## 2024-11-07 RX ADMIN — BUDESONIDE AND FORMOTEROL FUMARATE DIHYDRATE 2 PUFF: 160; 4.5 AEROSOL RESPIRATORY (INHALATION) at 12:14

## 2024-11-07 RX ADMIN — METFORMIN HYDROCHLORIDE 500 MG: 500 TABLET, EXTENDED RELEASE ORAL at 07:56

## 2024-11-07 RX ADMIN — ENOXAPARIN SODIUM 40 MG: 100 INJECTION SUBCUTANEOUS at 07:59

## 2024-11-07 RX ADMIN — Medication 400 MG: at 07:56

## 2024-11-07 RX ADMIN — LOSARTAN POTASSIUM 100 MG: 100 TABLET, FILM COATED ORAL at 07:57

## 2024-11-07 RX ADMIN — ANTI-FUNGAL POWDER MICONAZOLE NITRATE TALC FREE: 1.42 POWDER TOPICAL at 11:03

## 2024-11-07 RX ADMIN — BUDESONIDE AND FORMOTEROL FUMARATE DIHYDRATE 2 PUFF: 160; 4.5 AEROSOL RESPIRATORY (INHALATION) at 19:38

## 2024-11-07 RX ADMIN — ATORVASTATIN CALCIUM 10 MG: 10 TABLET, FILM COATED ORAL at 07:56

## 2024-11-07 RX ADMIN — HYDROCHLOROTHIAZIDE 25 MG: 25 TABLET ORAL at 07:56

## 2024-11-07 RX ADMIN — PANTOPRAZOLE SODIUM 40 MG: 40 TABLET, DELAYED RELEASE ORAL at 06:10

## 2024-11-07 RX ADMIN — ASPIRIN 81 MG 81 MG: 81 TABLET ORAL at 07:56

## 2024-11-07 RX ADMIN — Medication 400 MG: at 22:37

## 2024-11-07 RX ADMIN — CLOPIDOGREL BISULFATE 75 MG: 75 TABLET ORAL at 07:56

## 2024-11-07 ASSESSMENT — PAIN SCALES - WONG BAKER
WONGBAKER_NUMERICALRESPONSE: NO HURT
WONGBAKER_NUMERICALRESPONSE: NO HURT

## 2024-11-07 ASSESSMENT — PAIN SCALES - GENERAL
PAINLEVEL_OUTOF10: 0
PAINLEVEL_OUTOF10: 0

## 2024-11-07 NOTE — PROGRESS NOTES
Comprehensive Nutrition Assessment    Type and Reason for Visit:  Reassess    Nutrition Recommendations/Plan:   Continue current diet.     Malnutrition Assessment:  Malnutrition Status:  No malnutrition (11/01/24 1337)    Context:  Acute Illness       Nutrition Assessment:    FU - Pt. continues on a cardiac diet. Diet acceptance appears adequate.This writer was informed by MD that Pt. was interested in diet education for most appropriate therapeutic diet. See education note completed today for further details. He reports good acceptance to meals. Mild weight fluctuations throughout admission, BMI 30. No need for supplements at this time. Will continue to monitor nutritional adequacy and diet acceptance.    Nutrition Related Findings:    Na 130. LBM 11/6.         Current Nutrition Intake & Therapies:    Average Meal Intake: %  Average Supplements Intake: None Ordered  ADULT DIET; Regular; Low Fat/Low Chol/High Fiber/ALVARO    Anthropometric Measures:  Height: 167.6 cm (5' 5.98\")  Ideal Body Weight (IBW): 142 lbs (65 kg)       Current Body Weight: 85.5 kg (188 lb 7.9 oz), 133.8 % IBW.    Current BMI (kg/m2): 30.4                             BMI Categories: Obese Class 1 (BMI 30.0-34.9)    Estimated Daily Nutrient Needs:  Energy Requirements Based On: Kcal/kg  Weight Used for Energy Requirements: Usual  Energy (kcal/day): 2708-0486 kcal (18-23 kcal/kg)  Weight Used for Protein Requirements: Usual  Protein (g/day):  g (1-1.2g/kg)  Method Used for Fluid Requirements: 1 ml/kcal  Fluid (ml/day): Or per provider    Nutrition Diagnosis:   Increased nutrient needs related to increase demand for energy/nutrients as evidenced by rehab for strength and conditioning    Nutrition Interventions:   Food and/or Nutrient Delivery: Continue Current Diet  Nutrition Education/Counseling:  (Monitor need)  Coordination of Nutrition Care: Continue to monitor while inpatient       Goals:  Goals: PO intake 50% or greater  Type of  Goal: Continue current goal  Previous Goal Met: Goal(s) Achieved    Nutrition Monitoring and Evaluation:   Behavioral-Environmental Outcomes: None Identified  Food/Nutrient Intake Outcomes: Food and Nutrient Intake, Vitamin/Mineral Intake  Physical Signs/Symptoms Outcomes: Biochemical Data, GI Status, Nutrition Focused Physical Findings    Discharge Planning:    Continue current diet     Madisyn Watts RD  Contact: [phone]

## 2024-11-07 NOTE — PROGRESS NOTES
Department of Physical Medicine & Rehabilitation  Progress Note    Patient Identification:  Sean Jackson  [id number]  : 1955  Admit date: 10/31/2024    Chief Complaint: Acute ischemic stroke (HCC)    Subjective:   No acute events overnight.   Patient seen this afternoon sitting up in room. Reports ongoing progress. Was able to meet with dietitian and found this helpful. We discussed how making gradual changes can improve his health long term. Also discussed smoking cessation. He previously quit cigarettes but has been smoking cigars. He is motivated to quit.   Labs reviewed.     ROS: No f/c, n/v, cp     Objective:  Patient Vitals for the past 24 hrs:   BP Temp Temp src Pulse Resp SpO2 Weight   24 1220 136/82 -- -- 88 -- -- --   24 1215 -- -- -- 91 -- -- --   24 0745 (!) 149/91 97.5 °F (36.4 °C) Oral 90 17 95 % --   24 0600 -- -- -- -- -- -- 85.5 kg (188 lb 7.9 oz)   24 -- -- -- 98 16 96 % --   245 132/80 97.7 °F (36.5 °C) Oral 73 18 92 % --     Const: Alert. No distress, pleasant.   HEENT: Normocephalic, atraumatic. Normal sclera/conjunctiva. MMM.   CV: Regular rate and rhythm.   Resp: No respiratory distress. Lungs CTAB.   Abd: Soft, nontender, nondistended, NABS+   Ext: No edema.   Neuro: Alert, oriented, appropriately interactive. Left hemiparesis (LUE 4+/5, LLE 4/5 except 1/5 left ankle DF and PF). Decreased coordination left hemibody  Psych: Cooperative, appropriate mood and affect    Laboratory data: Available via EMR.   Last 24 hour lab  Recent Results (from the past 24 hour(s))   POCT Glucose    Collection Time: 24  4:19 PM   Result Value Ref Range    POC Glucose 160 (H) 70 - 99 mg/dl    Performed on ACCU-CHEK    POCT Glucose    Collection Time: 24  8:53 PM   Result Value Ref Range    POC Glucose 138 (H) 70 - 99 mg/dl    Performed on ACCU-CHEK    CBC with Auto Differential    Collection Time: 24  5:56 AM   Result Value Ref Range     WBC 5.3 4.0 - 11.0 K/uL    RBC 5.31 4.20 - 5.90 M/uL    Hemoglobin 14.3 13.5 - 17.5 g/dL    Hematocrit 42.6 40.5 - 52.5 %    MCV 80.2 80.0 - 100.0 fL    MCH 26.9 26.0 - 34.0 pg    MCHC 33.6 31.0 - 36.0 g/dL    RDW 14.0 12.4 - 15.4 %    Platelets 222 135 - 450 K/uL    MPV 7.9 5.0 - 10.5 fL    Neutrophils % 53.3 %    Lymphocytes % 26.9 %    Monocytes % 12.9 %    Eosinophils % 6.0 %    Basophils % 0.9 %    Neutrophils Absolute 2.9 1.7 - 7.7 K/uL    Lymphocytes Absolute 1.4 1.0 - 5.1 K/uL    Monocytes Absolute 0.7 0.0 - 1.3 K/uL    Eosinophils Absolute 0.3 0.0 - 0.6 K/uL    Basophils Absolute 0.0 0.0 - 0.2 K/uL   Basic Metabolic Panel    Collection Time: 11/07/24  5:56 AM   Result Value Ref Range    Sodium 130 (L) 136 - 145 mmol/L    Potassium 3.7 3.5 - 5.1 mmol/L    Chloride 92 (L) 99 - 110 mmol/L    CO2 29 21 - 32 mmol/L    Anion Gap 9 3 - 16    Glucose 104 (H) 70 - 99 mg/dL    BUN 11 7 - 20 mg/dL    Creatinine 0.8 0.8 - 1.3 mg/dL    Est, Glom Filt Rate >90 >60    Calcium 8.9 8.3 - 10.6 mg/dL   Magnesium    Collection Time: 11/07/24  5:56 AM   Result Value Ref Range    Magnesium 1.88 1.80 - 2.40 mg/dL   POCT Glucose    Collection Time: 11/07/24  7:08 AM   Result Value Ref Range    POC Glucose 121 (H) 70 - 99 mg/dl    Performed on ACCU-CHEK    POCT Glucose    Collection Time: 11/07/24 10:43 AM   Result Value Ref Range    POC Glucose 116 (H) 70 - 99 mg/dl    Performed on ACCU-CHEK    POCT Glucose    Collection Time: 11/07/24  4:08 PM   Result Value Ref Range    POC Glucose 97 70 - 99 mg/dl    Performed on ACCU-CHEK        Therapy progress:  Physical therapy:  Bed Mobility:  Overall Assistance Level: Stand By Assist, Supervision  Sit>supine:  Assistance Level: Stand by assist  Supine>sit:  Assistance Level: Stand by assist  Transfers:  Surface: Wheelchair, From chair without arms, To chair without arms  Additional Factors: Set-up, Hand placement cues  Device: Walker  Sit>stand:  Assistance Level: Stand by assist  Skilled

## 2024-11-07 NOTE — PROGRESS NOTES
OCCUPATIONAL THERAPY  Progress Note   Second Session    Patient Name: Sean Jackson  Medical Record Number: [id number]    Treatment Diagnosis: Impaired functional mobility    General  Chart Reviewed: Yes  Patient assessed for rehabilitation services?: Yes  Additional Pertinent Hx: Per H&P \"Patient is a 69-year-old male who developed a sudden onset of left arm and leg weakness, ataxia, and dysarthria.  Patient was brought to the hospital where MRI scan of the brain revealed a small acute infarction of the right thalamus.  Patient known to have history of hypertension, diabetes type 2, COPD, and increased lipids.  Patient seen by neurology and started on aspirin and Plavix.  Patient started in therapies, and needs min assist for transfers and gait of 40 feet due to his weakness and ataxia.  Patient needs min to mod assist for lower body bathing and dressing activities.  Patient lives with a roommate in a mobile home and was independent prior to admission.  He states his son and daughter-in-law lives next-door.\"  Family / Caregiver Present: No  Referring Practitioner: Beryl Greenberg MD  Diagnosis: acute ischemic stroke     Restrictions/Precautions  Restrictions/Precautions: Fall Risk        Position Activity Restriction  Other position/activity restrictions: JANI stockings    Pt met in dept, reports no pain and agreeable to OT treat.     Pt completed balance/strengthening activity for carryover to ADL/transfers. Pt in stance holding 2# dowel elsy to complete ball pass back to target. Pt hit ball back to target with moderate accuracy, balance SBA/CGA.   Seated pt completed therex with 2# dowel elsy. X20 shoulder flexion, chest extension, elbow flexion, external rotation R/L, wrist flexion/ext. Good tolerance.     Pt completed kitchen safety assessment, scored 80 points which indicated distant supervision with safety, delayed processing of environmental situations. He has roommate which can provide this. His  balance with RW SBA, no LOB but slightly unsteady, CGA when reaching to ground level to retrieve broom.     Pt amb back to room with RW ~150' SBA with one seated rest break to surface with no arm rest >< SBA. He transferred to recliner and was left with all needs met.     Pt is progressing well towards goals, plan is DC on Wed 11/13 with HHOT and PRN assist. Cont w/ treat per POC.     Safety Device - Type of devices:  []  All fall risk precautions in place [] Bed alarm in place  [x] Call light within reach [] Chair alarm in place [] Positioning belt [x] Gait belt [x] Patient at risk for falls [] Left in bed [x] Left in chair [] Telesitter in use [] Sitter present [] Nurse notified []  None      Therapy Time   Individual Co-treatment   Time In 1400     Time Out 1445     Minutes 45       Electronically signed by Tonya Higuera OT on 11/7/2024 at 3:56 PM

## 2024-11-07 NOTE — PLAN OF CARE
Problem: Discharge Planning  Goal: Discharge to home or other facility with appropriate resources  Outcome: Progressing     Problem: Chronic Conditions and Co-morbidities  Goal: Patient's chronic conditions and co-morbidity symptoms are monitored and maintained or improved  Outcome: Progressing     Problem: Skin/Tissue Integrity  Goal: Absence of new skin breakdown  Description: 1.  Monitor for areas of redness and/or skin breakdown  2.  Assess vascular access sites hourly  3.  Every 4-6 hours minimum:  Change oxygen saturation probe site  4.  Every 4-6 hours:  If on nasal continuous positive airway pressure, respiratory therapy assess nares and determine need for appliance change or resting period.  Outcome: Progressing     Problem: Safety - Adult  Goal: Free from fall injury  Outcome: Progressing     Problem: Pain  Goal: Verbalizes/displays adequate comfort level or baseline comfort level  Outcome: Progressing     Problem: Nutrition Deficit:  Goal: Optimize nutritional status  Outcome: Progressing     Problem: ABCDS Injury Assessment  Goal: Absence of physical injury  Outcome: Progressing     Problem: Neurosensory - Adult  Goal: Achieves stable or improved neurological status  Outcome: Progressing     Problem: Respiratory - Adult  Goal: Achieves optimal ventilation and oxygenation  Outcome: Progressing     Problem: Skin/Tissue Integrity - Adult  Goal: Skin integrity remains intact  Outcome: Progressing     Problem: Musculoskeletal - Adult  Goal: Return mobility to safest level of function  Outcome: Progressing

## 2024-11-07 NOTE — PROGRESS NOTES
Physical Therapy  Facility/Department: [address] [facility]  Rehabilitation Physical Therapy Treatment Note    NAME: Sean Jackson  : 1955 (69 y.o.)  MRN: [id number]  CODE STATUS: Full Code    Date of Service: 24       Restrictions:  Restrictions/Precautions: Fall Risk  Position Activity Restriction  Other position/activity restrictions: JANI stockings     SUBJECTIVE  Subjective  Subjective: Pt reports he is doing fairly well this AM  Pain: denies pain           OBJECTIVE  Cognition  Overall Cognitive Status: Exceptions  Safety Judgement: Decreased awareness of need for assistance;Decreased awareness of need for safety  Orientation  Overall Orientation Status: Within Functional Limits    Functional Mobility  Balance  Sitting Balance: Supervision  Standing Balance: Stand by assistance  Transfers  Surface: Wheelchair;From chair without arms;To chair without arms  Additional Factors: Set-up;Hand placement cues  Device: Walker  Sit to Stand  Assistance Level: Stand by assist  Stand to Sit  Assistance Level: Stand by assist  Sit <> stand with SBA from varying surfaces throughout session       Environmental Mobility  Ambulation  Surface: Level surface  Device: Rolling walker  Distance: 132', 68' + short distances in therapy gym  Activity: Within Unit  Additional Factors: Increased time to complete;Verbal cues  Assistance Level: Stand by assist  Gait Deviations: Slow krish;Decreased step length left;Decreased heel strike left;Unsteady gait  Skilled Clinical Factors: decreased push off/foot drop LLE, lean towards Left side, variable path during turns around obstacles/in the room  Stairs  Stair Height: 6''  Device: Bilateral handrails  Number of Stairs: 4  Additional Factors: Non-reciprocal going up;Non-reciprocal going down;Increased time to complete;Verbal cues  Assistance Level: Stand by assist  Amb 150', 144', 241' x1 with RW and SBA              PT Exercises  Exercise Treatment: Standing in // bars: heel

## 2024-11-07 NOTE — PROGRESS NOTES
Patient admitted to rehab with CVA.  A/Ox4. Transfers with walker, less cues needed, left ankle brace on when walking. On Cardiac diet, tolerating well, reviewed diabetic needs. Medications taken whole. On Lovenox for DVT prophylaxis.  Skin: kathryn area healing. Has been continent of bowel and of bladder. LBM today. Chair/bed alarms in use and call light in reach. Will monitor for safety.

## 2024-11-07 NOTE — PROGRESS NOTES
Occupational Therapy  Facility/Department: [address] [facility]  Rehabilitation Occupational Therapy Daily Treatment Note    Date: 24  Patient Name: Sean Jackson       Room: P3O-8511/3270-01  MRN: [id number]  Account: [id number]   : 1955  (69 y.o.) Gender: male       Past Medical History:  has a past medical history of Asthma, GERD (gastroesophageal reflux disease), Hyperlipidemia, and Hypertension.  Past Surgical History:   has a past surgical history that includes eye muscle surgery; Wrist surgery; and Appendectomy.    Restrictions  Restrictions/Precautions: Fall Risk  Other position/activity restrictions: JANI stockings    Subjective  Subjective: Patient met in room. Agreeable to shower.  Restrictions/Precautions: Fall Risk       Objective     Cognition  Overall Cognitive Status: Exceptions  Safety Judgement: Decreased awareness of need for assistance;Decreased awareness of need for safety  Orientation  Overall Orientation Status: Within Functional Limits         ADL  Grooming/Oral Hygiene  Assistance Level: Stand by assist  Skilled Clinical Factors: Seated in recliner chair to brush hair. Reports completing oral care prior to ADL session  Upper Extremity Bathing  Assistance Level: Supervision  Skilled Clinical Factors: Seated on shower chair for upper body bathing, able to manage water on/off and [facility]  Lower Extremity Bathing  Assistance Level: Stand by assist  Skilled Clinical Factors: Pt crossed his legs to wash his feet. Leans side to side on shower chair to wash buttocks  Upper Extremity Dressing  Assistance Level: Independent  Skilled Clinical Factors: Pt doffed shirt seated on shower chair, seated in chair to don shirt after gathing clothes from closet  Lower Extremity Dressing  Assistance Level: Stand by assist  Skilled Clinical Factors: Crossed LE's to thread underclothing and pants over feet seated in chair with arms. SBA for balance for over hips  Putting On/Taking Off

## 2024-11-07 NOTE — FLOWSHEET NOTE
Patient admitted to ARU with CVA.  A/Ox4. Transfers with walker x1. Mobility restrictions: WBAT. On Cardiac diet, tolerating well. Medications taken whole with thin liquids. On Plavix and Lovenox for DVT prophylaxis.  Skin: scattered patches of psoriasis, redness to groin, healing tear to gluteal slit. Stated he could apply the Micotin powder to his own areas. Refused his senna plus. Been refusing this since admission. Requested to have this prn or d/c'd. Leaving note with MRAJ BARAHONA WNL. No pain complaint.    Oxygen: RA. LDA: none. Transfers with walker. Refused to wear his gaitbelt.  Has been continent of bowel and of bladder. LBM 11-6. Chair/bed alarms in use and call light within reach.

## 2024-11-07 NOTE — PLAN OF CARE
Problem: Discharge Planning  Goal: Discharge to home or other facility with appropriate resources  11/7/2024 1133 by Magnolia Adhikari, RN  Outcome: Progressing  Flowsheets  Taken 11/7/2024 1133  Discharge to home or other facility with appropriate resources:   Identify barriers to discharge with patient and caregiver   Arrange for needed discharge resources and transportation as appropriate   Identify discharge learning needs (meds, wound care, etc)  Taken 11/7/2024 0749  Discharge to home or other facility with appropriate resources:   Identify barriers to discharge with patient and caregiver   Arrange for needed discharge resources and transportation as appropriate   Identify discharge learning needs (meds, wound care, etc)  Note: Recall can vary related to medications, CVA, diabetic needs. Continue education.  11/7/2024 0232 by Lina Carlson, RN  Outcome: Progressing     Problem: Chronic Conditions and Co-morbidities  Goal: Patient's chronic conditions and co-morbidity symptoms are monitored and maintained or improved  11/7/2024 1133 by Magnolia Adhikari RN  Outcome: Progressing  Flowsheets (Taken 11/7/2024 0749)  Care Plan - Patient's Chronic Conditions and Co-Morbidity Symptoms are Monitored and Maintained or Improved:   Monitor and assess patient's chronic conditions and comorbid symptoms for stability, deterioration, or improvement   Collaborate with multidisciplinary team to address chronic and comorbid conditions and prevent exacerbation or deterioration   Update acute care plan with appropriate goals if chronic or comorbid symptoms are exacerbated and prevent overall improvement and discharge  11/7/2024 0232 by Lina Carlson, RN  Outcome: Progressing     Problem: Skin/Tissue Integrity  Goal: Absence of new skin breakdown  Description: 1.  Monitor for areas of redness and/or skin breakdown  2.  Assess vascular access sites hourly  3.  Every 4-6 hours minimum:  Change oxygen saturation probe

## 2024-11-08 LAB
GLUCOSE BLD-MCNC: 115 MG/DL (ref 70–99)
GLUCOSE BLD-MCNC: 119 MG/DL (ref 70–99)
GLUCOSE BLD-MCNC: 68 MG/DL (ref 70–99)
PERFORMED ON: ABNORMAL

## 2024-11-08 PROCEDURE — 1280000000 HC REHAB R&B

## 2024-11-08 PROCEDURE — 6370000000 HC RX 637 (ALT 250 FOR IP): Performed by: PHYSICAL MEDICINE & REHABILITATION

## 2024-11-08 PROCEDURE — 94640 AIRWAY INHALATION TREATMENT: CPT

## 2024-11-08 PROCEDURE — 97530 THERAPEUTIC ACTIVITIES: CPT

## 2024-11-08 PROCEDURE — 94760 N-INVAS EAR/PLS OXIMETRY 1: CPT

## 2024-11-08 PROCEDURE — 97116 GAIT TRAINING THERAPY: CPT | Performed by: PHYSICAL THERAPIST

## 2024-11-08 PROCEDURE — 97110 THERAPEUTIC EXERCISES: CPT

## 2024-11-08 PROCEDURE — 6360000002 HC RX W HCPCS: Performed by: PHYSICAL MEDICINE & REHABILITATION

## 2024-11-08 PROCEDURE — 97110 THERAPEUTIC EXERCISES: CPT | Performed by: PHYSICAL THERAPIST

## 2024-11-08 PROCEDURE — 97535 SELF CARE MNGMENT TRAINING: CPT

## 2024-11-08 RX ADMIN — ENOXAPARIN SODIUM 40 MG: 100 INJECTION SUBCUTANEOUS at 07:49

## 2024-11-08 RX ADMIN — PANTOPRAZOLE SODIUM 40 MG: 40 TABLET, DELAYED RELEASE ORAL at 06:32

## 2024-11-08 RX ADMIN — ASPIRIN 81 MG 81 MG: 81 TABLET ORAL at 07:51

## 2024-11-08 RX ADMIN — ATORVASTATIN CALCIUM 10 MG: 10 TABLET, FILM COATED ORAL at 07:51

## 2024-11-08 RX ADMIN — LOSARTAN POTASSIUM 100 MG: 100 TABLET, FILM COATED ORAL at 07:51

## 2024-11-08 RX ADMIN — HYDROCHLOROTHIAZIDE 25 MG: 25 TABLET ORAL at 07:51

## 2024-11-08 RX ADMIN — CLOPIDOGREL BISULFATE 75 MG: 75 TABLET ORAL at 07:51

## 2024-11-08 RX ADMIN — METFORMIN HYDROCHLORIDE 500 MG: 500 TABLET, EXTENDED RELEASE ORAL at 07:51

## 2024-11-08 RX ADMIN — BUDESONIDE AND FORMOTEROL FUMARATE DIHYDRATE 2 PUFF: 160; 4.5 AEROSOL RESPIRATORY (INHALATION) at 20:23

## 2024-11-08 RX ADMIN — ANTI-FUNGAL POWDER MICONAZOLE NITRATE TALC FREE: 1.42 POWDER TOPICAL at 20:46

## 2024-11-08 RX ADMIN — Medication 400 MG: at 07:51

## 2024-11-08 RX ADMIN — Medication 400 MG: at 20:46

## 2024-11-08 RX ADMIN — BUDESONIDE AND FORMOTEROL FUMARATE DIHYDRATE 2 PUFF: 160; 4.5 AEROSOL RESPIRATORY (INHALATION) at 08:28

## 2024-11-08 ASSESSMENT — PAIN SCALES - GENERAL: PAINLEVEL_OUTOF10: 0

## 2024-11-08 ASSESSMENT — PAIN SCALES - WONG BAKER: WONGBAKER_NUMERICALRESPONSE: NO HURT

## 2024-11-08 NOTE — FLOWSHEET NOTE
Patient admitted to ARU with dx of CVA.  A/Ox4. Transfers with walker x1,  resistant in using a gaitbelt, less cues needed, left ankle air cast brace on when walking. On Cardiac diet, tolerating well. Medications taken whole with thin liquids. On Lovenox for DVT prophylaxis.  Skin: perirectal area healing. Does own toilet hygiene. Has been continent of bowel and of bladder. LBM daily. Has been refusing senna plus routine. Left note with MD to have this stool softener d/c'd or prn.  Chair/bed alarms in use and call light within reach. Will monitor for safety.

## 2024-11-08 NOTE — PROGRESS NOTES
Department of Physical Medicine & Rehabilitation  Progress Note    Patient Identification:  Sean Jackson  [id number]  : 1955  Admit date: 10/31/2024    Chief Complaint: Acute ischemic stroke (HCC)    Subjective:   No acute events overnight.   Patient seen this afternoon working on mat exercises with PT. He had low blood sugar earlier but no symptoms. Having regular BMs so will dc scheduled stool softeners.   Labs reviewed.     ROS: No f/c, n/v, cp     Objective:  Patient Vitals for the past 24 hrs:   BP Temp Temp src Pulse Resp SpO2 Weight   24 0828 -- -- -- -- -- 95 % --   24 0716 129/62 97.9 °F (36.6 °C) Oral 67 17 95 % --   24 0452 -- -- -- -- -- -- 85.3 kg (188 lb 0.8 oz)   24 1940 -- -- -- -- -- 93 % --   24 1928 125/74 98.2 °F (36.8 °C) Oral 75 17 92 % --   24 1220 136/82 -- -- 88 -- -- --   24 1215 -- -- -- 91 -- -- --     Const: Alert. No distress, pleasant.   HEENT: Normocephalic, atraumatic. Normal sclera/conjunctiva. MMM.   CV: Regular rate and rhythm.   Resp: No respiratory distress. Lungs CTAB.   Abd: Soft, nontender, nondistended, NABS+   Ext: No edema.   Neuro: Alert, oriented, appropriately interactive. Left hemiparesis (LUE 4+/5, LLE 4/5 except 1/5 left ankle DF and PF). Decreased coordination left hemibody  Psych: Cooperative, appropriate mood and affect    Laboratory data: Available via EMR.   Last 24 hour lab  Recent Results (from the past 24 hour(s))   POCT Glucose    Collection Time: 24  4:08 PM   Result Value Ref Range    POC Glucose 97 70 - 99 mg/dl    Performed on ACCU-CHEK    POCT Glucose    Collection Time: 24  7:30 PM   Result Value Ref Range    POC Glucose 140 (H) 70 - 99 mg/dl    Performed on ACCU-CHEK    POCT Glucose    Collection Time: 24  6:57 AM   Result Value Ref Range    POC Glucose 119 (H) 70 - 99 mg/dl    Performed on ACCU-CHEK    POCT Glucose    Collection Time: 24 11:32 AM   Result Value Ref Range  statin  -PT/OT/SLP  -Left air cast ordered due to ankle instability    HTN  -continue HCTZ+ losartan  -- monitor trend, overall controlled    HLD  -continue atorvastatin    DM2 with hyperglycemia  -Resumed home metformin -- monitor trend, overall controlled    COPD  -continue Synbicort (subst for home Dulera), albuterol prn    Tobacco use disorder  -Nicotine patch offered  -cessation counseling provided -- motivated to quit    GERD  -continue pantoprazole    Bladder  -High risk retention  -Monitor PVRs, straight cath prn >400    Bowel  -High risk constipation  -Dc senna+colace BID,   -continue prn miralax, MoM, bisacodyl supp    Pain control  -acetaminophen prn    PPX:   -DVT: lovenox  -GI: pantoprazole    Rehab Progress: Making progress. Overall SBA-CGA for mobility, Set-up to modA for ADLs. Barriers include: left hemiparesis, left coordination, balance.    Anticipated Dispo: home with roommate  Services: Outpatient PT, OT, drivers eval  DME: TBD  ELOS: 11/13      Beryl Greenberg MD 11/8/2024, 12:12 PM

## 2024-11-08 NOTE — PROGRESS NOTES
Patient admitted to rehab with CVA.  A/Ox4. Transfers with walker, less cues needed, ankle brace. On cardiac diet, tolerating well, reviewed diabetic needs. Medications taken whole. On Lovenox for DVT prophylaxis.  Skin: monitor kathryn area healing. Has been continent of bladder. LBM 11/07/2024. Chair/bed alarms in use and call light in reach. Will monitor for safety.

## 2024-11-08 NOTE — PROGRESS NOTES
Stand by assist  Bed To/From Chair  Assistance Level: Stand by assist   OT Exercises  Exercise Treatment: Box yeni with 5 # weight 25 reps without reports of pain in left shoulder.  Resistive Exercises: Dowel elsy exercises with B UE's 10 reps  Motor Control/Coordination: Able to place remove rings from ROM tree with left UE with mild diff. Able to place perfection pieces in board without diff     Assessment  Assessment  Assessment: Today: Functional mobility with RW to/from bathroom with SBA, slow steady gait with no overt LOB noted. Functional mobility without RW with CGA, less balanced, one small LOB noted. Bed mobility with Supervision on regular bed. Able to place remove rings from ROM tree with left UE with mild diff. Able to place perfection pieces in board without diff. Box yeni with 5 # weight 25 reps without reports of pain in left shoulder. Patient would benefit from continued skilled OT to maximize IND  Activity Tolerance: Patient tolerated treatment well  Discharge Recommendations: 24 hour supervision or assist;Home with Home health OT;Patient would benefit from continued therapy after discharge;2-3 sessions per week  OT Equipment Recommendations  Other: Recommend TTB (pt reports he has one), reacher and wide sock aid  Safety Devices  Safety Devices in place: Yes  Type of devices: Gait belt    Patient Education  Education  Education Given To: Patient  Education Provided: Role of Therapy;Plan of Care;Safety;Transfer Training;Equipment;Energy Conservation;ADL Function;Mobility Training    Plan  Occupational Therapy Plan  Times Per Week: 5-6  Times Per Day: Twice a day    Goals  Patient Goals   Patient goals : \"to be independent\"  Short Term Goals  Time Frame for Short Term Goals: 10 days  Short Term Goal 1: pt will complete toileting w/ Mod I  Short Term Goal 2: pt will complete LB dressing/bathing w/ Mod I  Short Term Goal 3: pt will complete simple meal prep w/ Mod I  Short Term Goal 4: pt will  complete BUE HEP in all planes to address strength required for ADLs/fxl ADL transfers  Short Term Goal 5: pt will complete family edu closer to d/c to address potential DME concerns  Long Term Goals  Time Frame for Long Term Goals : LTG-STG        Therapy Time   Individual Concurrent Group Co-treatment   Time In 0915         Time Out 1000         Minutes 45               Electronically signed by Mary Fisher [id number] on 11/8/2024 at 9:59 AM    OTR was consulted with this patients treatment/intervention plan.

## 2024-11-08 NOTE — PROGRESS NOTES
Physical Therapy  Facility/Department: [address] [facility]  Rehabilitation Physical Therapy Treatment Note    NAME: Sean Jackson  : 1955 (69 y.o.)  MRN: [id number]  CODE STATUS: Full Code    Date of Service: 24       Restrictions:  Restrictions/Precautions: Fall Risk  Position Activity Restriction  Other position/activity restrictions: JANI stockings     SUBJECTIVE  Subjective  Subjective: Pt reports he is doing fairly well this AM  Pain: denies pain             OBJECTIVE  Cognition  Overall Cognitive Status: Exceptions  Safety Judgement: Decreased awareness of need for assistance;Decreased awareness of need for safety  Orientation  Overall Orientation Status: Within Functional Limits    Functional Mobility  Transfers  Surface: Wheelchair;To chair with arms;From chair with arms;To chair without arms;From chair without arms  Additional Factors: Set-up  Device: Walker  Sit to Stand  Assistance Level: Stand by assist  Stand to Sit  Assistance Level: Stand by assist  Sit <> stand with S; verbal cues 25% of the time       Environmental Mobility  Ambulation  Surface: Level surface;Carpet;Uneven surface  Device: Rolling walker  Distance: 200' x2, 150' x2  Activity: Within Unit  Activity Comments: Pt amb in unit hallway and outside on uneven surfaces. Pt attempt to walk without walker, however moved very slowly and felt slightly unsteady.  Additional Factors: Increased time to complete;Verbal cues  Assistance Level: Stand by assist  Gait Deviations: Slow krish;Decreased step length left;Decreased heel strike left;Unsteady gait  Skilled Clinical Factors: decreased push off/foot drop LLE, lean towards Left side, variable path during turns around obstacles/in the room  Amb 68', 150' with rollator and SBA.            PT Exercises  Exercise Treatment: Standing at railing outside: heel raises, squats, marching x20 reps, lateral stepping x2, lateral step ups x8 reps, quick stretch DF x10 reps  HEP Visit:

## 2024-11-08 NOTE — PLAN OF CARE
Problem: Discharge Planning  Goal: Discharge to home or other facility with appropriate resources  11/8/2024 1036 by Magnolia Adhikari, RN  Outcome: Progressing  Flowsheets (Taken 11/8/2024 0715)  Discharge to home or other facility with appropriate resources:   Identify barriers to discharge with patient and caregiver   Arrange for needed discharge resources and transportation as appropriate   Identify discharge learning needs (meds, wound care, etc)   Refer to discharge planning if patient needs post-hospital services based on physician order or complex needs related to functional status, cognitive ability or social support system  11/8/2024 0343 by Lina Carlson, RN  Outcome: Progressing     Problem: Chronic Conditions and Co-morbidities  Goal: Patient's chronic conditions and co-morbidity symptoms are monitored and maintained or improved  11/8/2024 1036 by Magnolia Adhikari, RN  Outcome: Progressing  Flowsheets (Taken 11/8/2024 0715)  Care Plan - Patient's Chronic Conditions and Co-Morbidity Symptoms are Monitored and Maintained or Improved:   Monitor and assess patient's chronic conditions and comorbid symptoms for stability, deterioration, or improvement   Collaborate with multidisciplinary team to address chronic and comorbid conditions and prevent exacerbation or deterioration   Update acute care plan with appropriate goals if chronic or comorbid symptoms are exacerbated and prevent overall improvement and discharge  11/8/2024 0343 by Lina Carlson, RN  Outcome: Progressing     Problem: Skin/Tissue Integrity  Goal: Absence of new skin breakdown  Description: 1.  Monitor for areas of redness and/or skin breakdown  2.  Assess vascular access sites hourly  3.  Every 4-6 hours minimum:  Change oxygen saturation probe site  4.  Every 4-6 hours:  If on nasal continuous positive airway pressure, respiratory therapy assess nares and determine need for appliance change or resting period.  11/8/2024 1036

## 2024-11-09 LAB
GLUCOSE BLD-MCNC: 109 MG/DL (ref 70–99)
GLUCOSE BLD-MCNC: 119 MG/DL (ref 70–99)
GLUCOSE BLD-MCNC: 155 MG/DL (ref 70–99)
GLUCOSE BLD-MCNC: 96 MG/DL (ref 70–99)
PERFORMED ON: ABNORMAL
PERFORMED ON: NORMAL

## 2024-11-09 PROCEDURE — 6360000002 HC RX W HCPCS: Performed by: PHYSICAL MEDICINE & REHABILITATION

## 2024-11-09 PROCEDURE — 6370000000 HC RX 637 (ALT 250 FOR IP): Performed by: PHYSICAL MEDICINE & REHABILITATION

## 2024-11-09 PROCEDURE — 1280000000 HC REHAB R&B

## 2024-11-09 PROCEDURE — 94640 AIRWAY INHALATION TREATMENT: CPT

## 2024-11-09 PROCEDURE — 94760 N-INVAS EAR/PLS OXIMETRY 1: CPT

## 2024-11-09 RX ADMIN — Medication 400 MG: at 07:50

## 2024-11-09 RX ADMIN — HYDROCHLOROTHIAZIDE 25 MG: 25 TABLET ORAL at 07:50

## 2024-11-09 RX ADMIN — PANTOPRAZOLE SODIUM 40 MG: 40 TABLET, DELAYED RELEASE ORAL at 06:20

## 2024-11-09 RX ADMIN — METFORMIN HYDROCHLORIDE 500 MG: 500 TABLET, EXTENDED RELEASE ORAL at 07:49

## 2024-11-09 RX ADMIN — Medication 400 MG: at 19:52

## 2024-11-09 RX ADMIN — ANTI-FUNGAL POWDER MICONAZOLE NITRATE TALC FREE: 1.42 POWDER TOPICAL at 07:52

## 2024-11-09 RX ADMIN — ASPIRIN 81 MG 81 MG: 81 TABLET ORAL at 07:49

## 2024-11-09 RX ADMIN — LOSARTAN POTASSIUM 100 MG: 100 TABLET, FILM COATED ORAL at 07:49

## 2024-11-09 RX ADMIN — BUDESONIDE AND FORMOTEROL FUMARATE DIHYDRATE 2 PUFF: 160; 4.5 AEROSOL RESPIRATORY (INHALATION) at 08:38

## 2024-11-09 RX ADMIN — ENOXAPARIN SODIUM 40 MG: 100 INJECTION SUBCUTANEOUS at 07:49

## 2024-11-09 RX ADMIN — ATORVASTATIN CALCIUM 10 MG: 10 TABLET, FILM COATED ORAL at 07:49

## 2024-11-09 RX ADMIN — CLOPIDOGREL BISULFATE 75 MG: 75 TABLET ORAL at 07:49

## 2024-11-09 RX ADMIN — BUDESONIDE AND FORMOTEROL FUMARATE DIHYDRATE 2 PUFF: 160; 4.5 AEROSOL RESPIRATORY (INHALATION) at 19:20

## 2024-11-09 ASSESSMENT — PAIN SCALES - WONG BAKER
WONGBAKER_NUMERICALRESPONSE: NO HURT
WONGBAKER_NUMERICALRESPONSE: NO HURT

## 2024-11-09 NOTE — PROGRESS NOTES
Patient admitted to rehab with CVA.  A/Ox 4. Transfers with walker. Mobility restrictions: air cast to left foot. On cardiac diet, tolerating well. Medications taken whole in thins. On Lovenox for DVT prophylaxis.  Skin: tear to gluteal slit; redness to groin; scattered bruising. Oxygen: none. LDA: none. Has been continent of bowel and continent of bladder with periods of urinary incontinence. LBM 11/7. Chair/bed alarms in use and call light in reach.

## 2024-11-09 NOTE — PLAN OF CARE
Problem: Chronic Conditions and Co-morbidities  Goal: Patient's chronic conditions and co-morbidity symptoms are monitored and maintained or improved  11/9/2024 1254 by Denita Rose RN  Outcome: Progressing  Flowsheets (Taken 11/8/2024 0715 by Magnolia Adhikari RN)  Care Plan - Patient's Chronic Conditions and Co-Morbidity Symptoms are Monitored and Maintained or Improved:   Monitor and assess patient's chronic conditions and comorbid symptoms for stability, deterioration, or improvement   Collaborate with multidisciplinary team to address chronic and comorbid conditions and prevent exacerbation or deterioration   Update acute care plan with appropriate goals if chronic or comorbid symptoms are exacerbated and prevent overall improvement and discharge  11/9/2024 0103 by Merle Lomeli, RN  Outcome: Progressing     Problem: Skin/Tissue Integrity  Goal: Absence of new skin breakdown  Description: 1.  Monitor for areas of redness and/or skin breakdown  2.  Assess vascular access sites hourly  3.  Every 4-6 hours minimum:  Change oxygen saturation probe site  4.  Every 4-6 hours:  If on nasal continuous positive airway pressure, respiratory therapy assess nares and determine need for appliance change or resting period.  11/9/2024 1254 by Denita Rose, RN  Outcome: Progressing  11/9/2024 0103 by Merle Lomeli, RN  Outcome: Progressing

## 2024-11-10 VITALS
HEIGHT: 66 IN | OXYGEN SATURATION: 91 % | DIASTOLIC BLOOD PRESSURE: 92 MMHG | HEART RATE: 82 BPM | RESPIRATION RATE: 16 BRPM | WEIGHT: 191.14 LBS | BODY MASS INDEX: 30.72 KG/M2 | SYSTOLIC BLOOD PRESSURE: 140 MMHG | TEMPERATURE: 98.3 F

## 2024-11-10 LAB
GLUCOSE BLD-MCNC: 124 MG/DL (ref 70–99)
PERFORMED ON: ABNORMAL

## 2024-11-10 PROCEDURE — 6370000000 HC RX 637 (ALT 250 FOR IP): Performed by: PHYSICAL MEDICINE & REHABILITATION

## 2024-11-10 PROCEDURE — 1280000000 HC REHAB R&B

## 2024-11-10 PROCEDURE — 6360000002 HC RX W HCPCS: Performed by: PHYSICAL MEDICINE & REHABILITATION

## 2024-11-10 PROCEDURE — 94640 AIRWAY INHALATION TREATMENT: CPT

## 2024-11-10 PROCEDURE — 94760 N-INVAS EAR/PLS OXIMETRY 1: CPT

## 2024-11-10 RX ADMIN — Medication 400 MG: at 20:54

## 2024-11-10 RX ADMIN — ANTI-FUNGAL POWDER MICONAZOLE NITRATE TALC FREE: 1.42 POWDER TOPICAL at 07:35

## 2024-11-10 RX ADMIN — HYDROCHLOROTHIAZIDE 25 MG: 25 TABLET ORAL at 07:35

## 2024-11-10 RX ADMIN — PANTOPRAZOLE SODIUM 40 MG: 40 TABLET, DELAYED RELEASE ORAL at 06:03

## 2024-11-10 RX ADMIN — BUDESONIDE AND FORMOTEROL FUMARATE DIHYDRATE 2 PUFF: 160; 4.5 AEROSOL RESPIRATORY (INHALATION) at 08:56

## 2024-11-10 RX ADMIN — ASPIRIN 81 MG 81 MG: 81 TABLET ORAL at 07:35

## 2024-11-10 RX ADMIN — ENOXAPARIN SODIUM 40 MG: 100 INJECTION SUBCUTANEOUS at 07:35

## 2024-11-10 RX ADMIN — CLOPIDOGREL BISULFATE 75 MG: 75 TABLET ORAL at 07:35

## 2024-11-10 RX ADMIN — BUDESONIDE AND FORMOTEROL FUMARATE DIHYDRATE 2 PUFF: 160; 4.5 AEROSOL RESPIRATORY (INHALATION) at 19:36

## 2024-11-10 RX ADMIN — ATORVASTATIN CALCIUM 10 MG: 10 TABLET, FILM COATED ORAL at 07:35

## 2024-11-10 RX ADMIN — LOSARTAN POTASSIUM 100 MG: 100 TABLET, FILM COATED ORAL at 07:35

## 2024-11-10 RX ADMIN — METFORMIN HYDROCHLORIDE 500 MG: 500 TABLET, EXTENDED RELEASE ORAL at 07:35

## 2024-11-10 RX ADMIN — Medication 400 MG: at 07:35

## 2024-11-10 ASSESSMENT — PAIN SCALES - WONG BAKER
WONGBAKER_NUMERICALRESPONSE: NO HURT
WONGBAKER_NUMERICALRESPONSE: NO HURT

## 2024-11-10 NOTE — PLAN OF CARE
Problem: Discharge Planning  Goal: Discharge to home or other facility with appropriate resources  Outcome: Progressing     Problem: Chronic Conditions and Co-morbidities  Goal: Patient's chronic conditions and co-morbidity symptoms are monitored and maintained or improved  11/10/2024 0251 by Lina Carlson RN  Outcome: Progressing  11/9/2024 1254 by Denita Rose RN  Outcome: Progressing  Flowsheets (Taken 11/8/2024 0715 by Magnolia Adhikari, RN)  Care Plan - Patient's Chronic Conditions and Co-Morbidity Symptoms are Monitored and Maintained or Improved:   Monitor and assess patient's chronic conditions and comorbid symptoms for stability, deterioration, or improvement   Collaborate with multidisciplinary team to address chronic and comorbid conditions and prevent exacerbation or deterioration   Update acute care plan with appropriate goals if chronic or comorbid symptoms are exacerbated and prevent overall improvement and discharge     Problem: Skin/Tissue Integrity  Goal: Absence of new skin breakdown  Description: 1.  Monitor for areas of redness and/or skin breakdown  2.  Assess vascular access sites hourly  3.  Every 4-6 hours minimum:  Change oxygen saturation probe site  4.  Every 4-6 hours:  If on nasal continuous positive airway pressure, respiratory therapy assess nares and determine need for appliance change or resting period.  11/10/2024 0251 by Lina Carlson RN  Outcome: Progressing  11/9/2024 1254 by Denita Rose RN  Outcome: Progressing     Problem: Safety - Adult  Goal: Free from fall injury  11/10/2024 0251 by Lina Carlson RN  Outcome: Progressing  11/9/2024 1254 by Denita Rose RN  Outcome: Progressing  Flowsheets (Taken 11/8/2024 1036 by Magnolia Adhikari RN)  Free From Fall Injury: Instruct family/caregiver on patient safety     Problem: Pain  Goal: Verbalizes/displays adequate comfort level or baseline comfort level  Outcome: Progressing     Problem: Nutrition

## 2024-11-10 NOTE — PROGRESS NOTES
Patient admitted to rehab with CVA.  A/Ox 4. Transfers with walker. Mobility restrictions: air cast to left foot. On cardiac diet, tolerating well. Medications taken whole in thins. On Lovenox for DVT prophylaxis.  Skin: tear to gluteal slit; redness to groin; scattered bruising. Oxygen: none. LDA: none. Has been continent of bowel and continent of bladder with periods of urinary incontinence. LBM 11/10. Chair/bed alarms in use and call light in reach.

## 2024-11-10 NOTE — FLOWSHEET NOTE
Patient admitted to ARU with dx of CVA.  A/O x 4. Transfers with walker x1. Mobility restrictions: air cast to left foot. On cardiac diet, tolerating well. Medications taken whole in thins. On Lovenox for DVT prophylaxis.  Skin: tear to gluteal slit; redness to groin; scattered bruising. Oxygen: none. LDA: none. Has been continent of bowel and continent of bladder with periods of urinary incontinence. LBM 11/7. Chair/bed alarms in use and call light within reach.

## 2024-11-10 NOTE — PROGRESS NOTES
assistance  Grooming/Oral Hygiene  Assistance Level: Stand by assist  Skilled Clinical Factors: Seated in recliner chair to brush hair. Reports completing oral care prior to ADL session  UE Bathing  Assistance Level: Supervision  Skilled Clinical Factors: Seated on shower chair for upper body bathing, able to manage water on/off and HHSH  LE Bathing  Assistance Level: Stand by assist  Skilled Clinical Factors: Pt crossed his legs to wash his feet. Leans side to side on shower chair to wash buttocks  UE Dressing  Assistance Level: Independent  Skilled Clinical Factors: Pt doffed shirt seated on shower chair, seated in chair to don shirt after gathing clothes from closet  LE Dressing  Equipment Provided: Reachers  Assistance Level: Stand by assist  Skilled Clinical Factors: Crossed LE's to thread underclothing and pants over feet seated in chair with arms. SBA for balance for over hips  Putting On/Taking Off Footwear  Equipment Provided: Sock aid  Assistance Level: Moderate assistance  Skilled Clinical Factors: Assist for compression socks, IND with shoes  Toileting  Assistance Level: Stand by assist  Skilled Clinical Factors: Patient with BM this am. Managed hygiene and clothing with SBA    Speech therapy:    ADULT DIET; Regular; Low Fat/Low Chol/High Fiber/ALVARO        Body mass index is 30.94 kg/m².    Rehabilitation Diagnosis:   Stroke (Right brain , left body)    Assessment and Plan:    Impairments: left hemiparesis, left coordination deficit, decreased balance    Acute ischemic right thalamic stroke  -secondary ppx: DAPT x 21 days followed by ASA monotherapy, statin  -PT/OT/SLP  -Left air cast ordered due to ankle instability    HTN  -continue HCTZ+ losartan  -- monitor trend, overall controlled    HLD  -continue atorvastatin    DM2 with hyperglycemia  -ISS while inpatient  -Resumed home metformin -- monitor trend, overall controlled    COPD  -continue Synbicort (subst for home Dulera), albuterol prn    Tobacco

## 2024-11-10 NOTE — PLAN OF CARE
Problem: Chronic Conditions and Co-morbidities  Goal: Patient's chronic conditions and co-morbidity symptoms are monitored and maintained or improved  11/10/2024 1213 by Denita Rose RN  Outcome: Progressing  Flowsheets (Taken 11/8/2024 0715 by Magnolia Adhikari RN)  Care Plan - Patient's Chronic Conditions and Co-Morbidity Symptoms are Monitored and Maintained or Improved:   Monitor and assess patient's chronic conditions and comorbid symptoms for stability, deterioration, or improvement   Collaborate with multidisciplinary team to address chronic and comorbid conditions and prevent exacerbation or deterioration   Update acute care plan with appropriate goals if chronic or comorbid symptoms are exacerbated and prevent overall improvement and discharge  11/10/2024 0251 by Lina Carlson, RN  Outcome: Progressing     Problem: Skin/Tissue Integrity  Goal: Absence of new skin breakdown  Description: 1.  Monitor for areas of redness and/or skin breakdown  2.  Assess vascular access sites hourly  3.  Every 4-6 hours minimum:  Change oxygen saturation probe site  4.  Every 4-6 hours:  If on nasal continuous positive airway pressure, respiratory therapy assess nares and determine need for appliance change or resting period.  11/10/2024 1213 by Denita Rose, RN  Outcome: Progressing  Note: Assessment completed.  No sign or symptoms of infection noted.    11/10/2024 0251 by Lina Carlson RN  Outcome: Progressing

## 2024-11-11 LAB
ANION GAP SERPL CALCULATED.3IONS-SCNC: 9 MMOL/L (ref 3–16)
BASOPHILS # BLD: 0.1 K/UL (ref 0–0.2)
BASOPHILS NFR BLD: 0.9 %
BUN SERPL-MCNC: 11 MG/DL (ref 7–20)
CALCIUM SERPL-MCNC: 9 MG/DL (ref 8.3–10.6)
CHLORIDE SERPL-SCNC: 93 MMOL/L (ref 99–110)
CO2 SERPL-SCNC: 30 MMOL/L (ref 21–32)
CREAT SERPL-MCNC: 0.7 MG/DL (ref 0.8–1.3)
DEPRECATED RDW RBC AUTO: 14 % (ref 12.4–15.4)
EOSINOPHIL # BLD: 0.4 K/UL (ref 0–0.6)
EOSINOPHIL NFR BLD: 7.2 %
GFR SERPLBLD CREATININE-BSD FMLA CKD-EPI: >90 ML/MIN/{1.73_M2}
GLUCOSE BLD-MCNC: 111 MG/DL (ref 70–99)
GLUCOSE SERPL-MCNC: 92 MG/DL (ref 70–99)
HCT VFR BLD AUTO: 41.6 % (ref 40.5–52.5)
HGB BLD-MCNC: 13.8 G/DL (ref 13.5–17.5)
LYMPHOCYTES # BLD: 1.8 K/UL (ref 1–5.1)
LYMPHOCYTES NFR BLD: 29.9 %
MAGNESIUM SERPL-MCNC: 1.79 MG/DL (ref 1.8–2.4)
MCH RBC QN AUTO: 26.8 PG (ref 26–34)
MCHC RBC AUTO-ENTMCNC: 33.3 G/DL (ref 31–36)
MCV RBC AUTO: 80.7 FL (ref 80–100)
MONOCYTES # BLD: 0.8 K/UL (ref 0–1.3)
MONOCYTES NFR BLD: 14.1 %
NEUTROPHILS # BLD: 2.8 K/UL (ref 1.7–7.7)
NEUTROPHILS NFR BLD: 47.9 %
PERFORMED ON: ABNORMAL
PLATELET # BLD AUTO: 221 K/UL (ref 135–450)
PMV BLD AUTO: 8.1 FL (ref 5–10.5)
POTASSIUM SERPL-SCNC: 4.1 MMOL/L (ref 3.5–5.1)
RBC # BLD AUTO: 5.15 M/UL (ref 4.2–5.9)
SODIUM SERPL-SCNC: 132 MMOL/L (ref 136–145)
WBC # BLD AUTO: 5.9 K/UL (ref 4–11)

## 2024-11-11 PROCEDURE — 94760 N-INVAS EAR/PLS OXIMETRY 1: CPT

## 2024-11-11 PROCEDURE — 80048 BASIC METABOLIC PNL TOTAL CA: CPT

## 2024-11-11 PROCEDURE — 97116 GAIT TRAINING THERAPY: CPT | Performed by: PHYSICAL THERAPIST

## 2024-11-11 PROCEDURE — 6360000002 HC RX W HCPCS: Performed by: PHYSICAL MEDICINE & REHABILITATION

## 2024-11-11 PROCEDURE — 6370000000 HC RX 637 (ALT 250 FOR IP): Performed by: PHYSICAL MEDICINE & REHABILITATION

## 2024-11-11 PROCEDURE — 85025 COMPLETE CBC W/AUTO DIFF WBC: CPT

## 2024-11-11 PROCEDURE — 97530 THERAPEUTIC ACTIVITIES: CPT

## 2024-11-11 PROCEDURE — 97530 THERAPEUTIC ACTIVITIES: CPT | Performed by: PHYSICAL THERAPIST

## 2024-11-11 PROCEDURE — 36415 COLL VENOUS BLD VENIPUNCTURE: CPT

## 2024-11-11 PROCEDURE — 1280000000 HC REHAB R&B

## 2024-11-11 PROCEDURE — 97535 SELF CARE MNGMENT TRAINING: CPT

## 2024-11-11 PROCEDURE — 83735 ASSAY OF MAGNESIUM: CPT

## 2024-11-11 PROCEDURE — 97110 THERAPEUTIC EXERCISES: CPT | Performed by: PHYSICAL THERAPIST

## 2024-11-11 PROCEDURE — 97110 THERAPEUTIC EXERCISES: CPT

## 2024-11-11 PROCEDURE — 94640 AIRWAY INHALATION TREATMENT: CPT

## 2024-11-11 RX ADMIN — HYDROCHLOROTHIAZIDE 25 MG: 25 TABLET ORAL at 07:22

## 2024-11-11 RX ADMIN — PANTOPRAZOLE SODIUM 40 MG: 40 TABLET, DELAYED RELEASE ORAL at 06:35

## 2024-11-11 RX ADMIN — BUDESONIDE AND FORMOTEROL FUMARATE DIHYDRATE 2 PUFF: 160; 4.5 AEROSOL RESPIRATORY (INHALATION) at 19:43

## 2024-11-11 RX ADMIN — CLOPIDOGREL BISULFATE 75 MG: 75 TABLET ORAL at 07:23

## 2024-11-11 RX ADMIN — ENOXAPARIN SODIUM 40 MG: 100 INJECTION SUBCUTANEOUS at 07:18

## 2024-11-11 RX ADMIN — METFORMIN HYDROCHLORIDE 500 MG: 500 TABLET, EXTENDED RELEASE ORAL at 07:22

## 2024-11-11 RX ADMIN — LOSARTAN POTASSIUM 100 MG: 100 TABLET, FILM COATED ORAL at 07:22

## 2024-11-11 RX ADMIN — Medication 400 MG: at 20:54

## 2024-11-11 RX ADMIN — ASPIRIN 81 MG 81 MG: 81 TABLET ORAL at 07:23

## 2024-11-11 RX ADMIN — Medication 400 MG: at 07:22

## 2024-11-11 RX ADMIN — BUDESONIDE AND FORMOTEROL FUMARATE DIHYDRATE 2 PUFF: 160; 4.5 AEROSOL RESPIRATORY (INHALATION) at 09:55

## 2024-11-11 RX ADMIN — ATORVASTATIN CALCIUM 10 MG: 10 TABLET, FILM COATED ORAL at 07:23

## 2024-11-11 ASSESSMENT — PAIN SCALES - WONG BAKER
WONGBAKER_NUMERICALRESPONSE: NO HURT
WONGBAKER_NUMERICALRESPONSE: NO HURT

## 2024-11-11 ASSESSMENT — PAIN SCALES - GENERAL: PAINLEVEL_OUTOF10: 0

## 2024-11-11 NOTE — CARE COORDINATION
SOCIAL WORK DISCHARGE SUMMARY        DATE OF DISCHARGE:    Wednesday, 11-      LOCATION:    Home      DENIED HOME CARE SERVICES AND OUT PATIENT SERVICES        TIME:   MERLENE   10-12 PM         PHARMACY:  [facility]        DME:      none      Transportation Question  \"NO\"      IMM:  11-    Referrals made:  offered transportation benefits from [facility] but he declined.    SANDRA Jo     Case Management   [phone]    11/11/2024  12:22 PM

## 2024-11-11 NOTE — CARE COORDINATION
Late Entry.  Met with patient on Friday, 11-8-2024 to review DC planning.  Informed him of DC plan for Wed 11- to home with out patient PT/OT.  He reports he is not sure he wants to do this due to a ride there.  Offered a referral to [facility] for transportation benefits.   He will think about it and I will recheck with him.  SANDRA Jo     Case Management   [phone]    11/11/2024  9:49 AM

## 2024-11-11 NOTE — PLAN OF CARE
Problem: Discharge Planning  Goal: Discharge to home or other facility with appropriate resources  Outcome: Progressing     Problem: Chronic Conditions and Co-morbidities  Goal: Patient's chronic conditions and co-morbidity symptoms are monitored and maintained or improved  11/11/2024 0200 by Lina Carlson RN  Outcome: Progressing  11/10/2024 1213 by Denita Rose RN  Outcome: Progressing  Flowsheets (Taken 11/8/2024 0715 by Magnolia Adhikari RN)  Care Plan - Patient's Chronic Conditions and Co-Morbidity Symptoms are Monitored and Maintained or Improved:   Monitor and assess patient's chronic conditions and comorbid symptoms for stability, deterioration, or improvement   Collaborate with multidisciplinary team to address chronic and comorbid conditions and prevent exacerbation or deterioration   Update acute care plan with appropriate goals if chronic or comorbid symptoms are exacerbated and prevent overall improvement and discharge     Problem: Skin/Tissue Integrity  Goal: Absence of new skin breakdown  Description: 1.  Monitor for areas of redness and/or skin breakdown  2.  Assess vascular access sites hourly  3.  Every 4-6 hours minimum:  Change oxygen saturation probe site  4.  Every 4-6 hours:  If on nasal continuous positive airway pressure, respiratory therapy assess nares and determine need for appliance change or resting period.  11/11/2024 0200 by Lina Carlson RN  Outcome: Progressing  11/10/2024 1213 by Denita Rose RN  Outcome: Progressing  Note: Assessment completed.  No sign or symptoms of infection noted.       Problem: Safety - Adult  Goal: Free from fall injury  Outcome: Progressing     Problem: Pain  Goal: Verbalizes/displays adequate comfort level or baseline comfort level  Outcome: Progressing     Problem: Nutrition Deficit:  Goal: Optimize nutritional status  Outcome: Progressing     Problem: ABCDS Injury Assessment  Goal: Absence of physical injury  Outcome: Progressing

## 2024-11-11 NOTE — PROGRESS NOTES
Department of Physical Medicine & Rehabilitation  Progress Note    Patient Identification:  Sean Jackson  [id number]  : 1955  Admit date: 10/31/2024    Chief Complaint: Acute ischemic stroke (HCC)    Subjective:   Patient states that he is feeling well and denies any new onset complaints.     ROS: No f/c, n/v, cp     Objective:  Patient Vitals for the past 24 hrs:   BP Temp Temp src Pulse Resp SpO2 Weight   11/10/24 1938 -- -- -- 75 16 94 % --   11/10/24 0900 -- -- -- 61 18 95 % --   11/10/24 0735 (!) 149/81 98 °F (36.7 °C) Oral 66 18 97 % --   11/10/24 0425 -- -- -- -- -- -- 86.7 kg (191 lb 2.2 oz)     Const: Alert. No distress, pleasant.   HEENT: Normocephalic, atraumatic. Normal sclera/conjunctiva. MMM.   CV: Regular rate and rhythm.   Resp: No respiratory distress. Lungs CTAB.   Abd: Soft, nontender, nondistended, NABS+   Ext: No edema.   Neuro: Alert, oriented, appropriately interactive. Left hemiparesis (LUE 4+/5, LLE 4/5 except 1/5 left ankle DF and PF). Decreased coordination left hemibody  Psych: Cooperative, appropriate mood and affect    Laboratory data: Available via EMR.   Last 24 hour lab  Recent Results (from the past 24 hour(s))   POCT Glucose    Collection Time: 11/10/24  7:11 AM   Result Value Ref Range    POC Glucose 124 (H) 70 - 99 mg/dl    Performed on ACCU-CHEK        Therapy progress:  Physical therapy:  Bed Mobility:  Overall Assistance Level: Stand By Assist, Supervision  Sit>supine:  Assistance Level: Stand by assist  Supine>sit:  Assistance Level: Stand by assist  Transfers:  Surface: Wheelchair, To chair with arms, From chair with arms, To chair without arms, From chair without arms  Additional Factors: Set-up  Device: Walker  Sit>stand:  Assistance Level: Stand by assist  Skilled Clinical Factors: slight impulsive, Min A for lower chair surface  Stand>sit:  Assistance Level: Stand by assist  Bed<>chair     Stand Pivot:     Lateral transfer:     Car transfer:  Assistance

## 2024-11-11 NOTE — PATIENT CARE CONFERENCE
[facility]  Inpatient Rehabilitation  Weekly Team Conference Note      Date: 2024  Patient Name:  Sean Jackson    MRN: [id number]  : 1955  Gender: Male  Physician: Dr. Yari SWAN  Diagnosis: Acute ischemic stroke (HCC) [I63.9]    CASE MANAGEMENT  Assessment: Goal is home, denied home care and Out Patient services.  Offered medical transportation via [employer] but he denied this.       PHYSICAL THERAPY    Bed Mobility:  Overall Assistance Level: Supervision  Sit>supine:  Assistance Level: Supervision  Supine>sit:  Assistance Level: Supervision    Transfers:  Surface: Wheelchair, From bed, To bed  Additional Factors: Set-up  Device: Walker  Sit>stand:  Assistance Level: Supervision  Skilled Clinical Factors: slight impulsive, Min A for lower chair surface  Stand>sit:  Assistance Level: Supervision  Bed<>chair  Assistance Level: Supervision     Car transfer:  Assistance Level: Supervision  Skilled Clinical Factors: verbal cueing for walker management; pt often leaves the walker behind    Ambulation:  Surface: Level surface  Device: Rolling walker  Distance: 200' x2, 375' x2  Activity: Within Unit  Activity Comments: Pt amb in unit hallway and outside on uneven surfaces. Pt attempt to walk without walker, however moved very slowly and felt slightly unsteady.  Additional Factors: Increased time to complete  Assistance Level: Supervision  Gait Deviations: Slow krish, Decreased step length left, Decreased heel strike left, Unsteady gait  Skilled Clinical Factors: amb with rollator and RW as pt has rollator at home    Stairs:  Stair Height: 6''  Device: Bilateral handrails  Number of Stairs: 4  Additional Factors: Non-reciprocal going up, Non-reciprocal going down, Increased time to complete  Assistance Level: Supervision  Skilled Clinical Factors: trialed attempt ascending LLE/descending RLE first - cues for safety. Mild LOB    Curb:  Curb Height: 6''  Device: Rolling walker  Number of

## 2024-11-11 NOTE — CARE COORDINATION
11/11/24 1217   IMM Letter   IMM Letter given to Patient/Family/Significant other/Guardian/POA/by: Presented to patient by bo ahumada.  He verbalized understanding of his benefit.   IMM Letter date given: 11/11/24   IMM Letter time given: 1218

## 2024-11-11 NOTE — PROGRESS NOTES
Department of Physical Medicine & Rehabilitation  Progress Note    Patient Identification:  Sean Jackson  [id number]  : 1955  Admit date: 10/31/2024    Chief Complaint: Acute ischemic stroke (HCC)    Subjective:   No acute events overnight.   Patient seen this afternoon sitting up in and eating lunch. Reports ongoing progress with therapies. Denies constipation. Sleeping ok. Left ankle still weak and unstable. Denies pain.   Labs reviewed.     ROS: No f/c, n/v, cp     Objective:  Patient Vitals for the past 24 hrs:   BP Temp Temp src Pulse Resp SpO2 Weight   24 0955 -- -- -- -- -- 93 % --   24 0715 135/87 97.7 °F (36.5 °C) Oral 85 17 92 % --   24 0630 -- -- -- -- -- -- 86.8 kg (191 lb 5.8 oz)   11/10/24 2030 (!) 140/92 98.3 °F (36.8 °C) Oral 82 16 91 % --   11/10/24 1938 -- -- -- 75 16 94 % --     Const: Alert. No distress, pleasant.   HEENT: Normocephalic, atraumatic. Normal sclera/conjunctiva. MMM.   CV: Regular rate and rhythm.   Resp: No respiratory distress. Lungs CTAB.   Abd: Soft, nontender, nondistended, NABS+   Ext: No edema.   Neuro: Alert, oriented, appropriately interactive. Left hemiparesis (LUE 4+/5, LLE 4/5 except 1/5 left ankle DF and PF). Decreased coordination left hemibody  Psych: Cooperative, appropriate mood and affect    Laboratory data: Available via EMR.   Last 24 hour lab  Recent Results (from the past 24 hour(s))   CBC with Auto Differential    Collection Time: 24  4:50 AM   Result Value Ref Range    WBC 5.9 4.0 - 11.0 K/uL    RBC 5.15 4.20 - 5.90 M/uL    Hemoglobin 13.8 13.5 - 17.5 g/dL    Hematocrit 41.6 40.5 - 52.5 %    MCV 80.7 80.0 - 100.0 fL    MCH 26.8 26.0 - 34.0 pg    MCHC 33.3 31.0 - 36.0 g/dL    RDW 14.0 12.4 - 15.4 %    Platelets 221 135 - 450 K/uL    MPV 8.1 5.0 - 10.5 fL    Neutrophils % 47.9 %    Lymphocytes % 29.9 %    Monocytes % 14.1 %    Eosinophils % 7.2 %    Basophils % 0.9 %    Neutrophils Absolute 2.8 1.7 - 7.7 K/uL    Lymphocytes

## 2024-11-11 NOTE — CARE COORDINATION
Met with pt today to review DC for Wed 11-.  He has decided he does not want to continue more therapy post his discharge.  He denied Home Care.  He denied Out Patient even when offered transportation under [facility].  He is agreeable to having any scripts filled at [facility].  He denied any transportation issues.  His grandson will be picking him up.  SANDRA Jo     Case Management   [phone]    11/11/2024  12:19 PM

## 2024-11-11 NOTE — PROGRESS NOTES
OCCUPATIONAL THERAPY  Progress Note   Second Session    Patient Name: Sean Jackson  Medical Record Number: [id number]    Treatment Diagnosis: Impaired functional mobility    General  Chart Reviewed: Yes  Patient assessed for rehabilitation services?: Yes  Additional Pertinent Hx: Per H&P \"Patient is a 69-year-old male who developed a sudden onset of left arm and leg weakness, ataxia, and dysarthria.  Patient was brought to the hospital where MRI scan of the brain revealed a small acute infarction of the right thalamus.  Patient known to have history of hypertension, diabetes type 2, COPD, and increased lipids.  Patient seen by neurology and started on aspirin and Plavix.  Patient started in therapies, and needs min assist for transfers and gait of 40 feet due to his weakness and ataxia.  Patient needs min to mod assist for lower body bathing and dressing activities.  Patient lives with a roommate in a mobile home and was independent prior to admission.  He states his son and daughter-in-law lives next-door.\"  Family / Caregiver Present: No  Referring Practitioner: Beryl Greenberg MD  Diagnosis: acute ischemic stroke     Restrictions/Precautions  Restrictions/Precautions: Fall Risk        Position Activity Restriction  Other position/activity restrictions: ALEXANDER stockings    Pt met in dept, reports no pain and agreeable to OT session. Pt doffed L Alexander hose by crossing LEs. He donned with effort and increased time, IND to don air cast and L shoe.     Pt amb w/ RW > bathroom supervision to complete tub transfer with TTB. Pt sat to TTB with supervision and cues for hand placement. He swung LEs over with effort and stood in shower pushing from TTB. He requested to step over ledge rather than sit / swing. He impulsively stepped over ledge with poor hand placement, balance CGA. OT demo'd use of grab bar for safer transfer, pt verbalized understanding. Still recommend using TTB for transfers ><, pt verbalized

## 2024-11-11 NOTE — FLOWSHEET NOTE
Patient admitted to ARU with dx of CVA.  A/Ox 4. Transfers with walker. Mobility restrictions: air cast to left foot for stability while walking. On cardiac diet, tolerating well. Medications taken whole in thin liquids. On Lovenox for DVT prophylaxis.  Skin: tear to gluteal slit; redness to groin; scattered bruising. Sustained skin tear/abrasion on right dorsal hand while scratching. Ref. D/d. Oxygen: none. LDA: none. Has been continent of bowel and continent of bladder, uses urinal at hs. LBM 11/10. Chair/bed alarms in use and call light within reach. No pain complaint. D/c on 11/13

## 2024-11-11 NOTE — PROGRESS NOTES
Occupational Therapy  Facility/Department: [address] [facility]HAB  Rehabilitation Occupational Therapy Daily Treatment Note    Date: 24  Patient Name: Sean Jackson       Room: E5T-8090/3270-01  MRN: [id number]  Account: [id number]   : 1955  (69 y.o.) Gender: male                    Past Medical History:  has a past medical history of Asthma, GERD (gastroesophageal reflux disease), Hyperlipidemia, and Hypertension.  Past Surgical History:   has a past surgical history that includes eye muscle surgery; Wrist surgery; and Appendectomy.    Restrictions  Restrictions/Precautions: Fall Risk  Other position/activity restrictions: JANI stockings    Subjective  Subjective: Pt seen in the department and agreed to OT treatment.  Pt declined a shower as he got one yesterday and will need to do DC ADL tomorrow.  Restrictions/Precautions: Fall Risk             Objective               ADL       Instrumental ADL's  Instrumental ADLs: Yes  Meal Prep  Meal Prep Level of Assistance: Stand by assistance  Meal Preparation: Pt completed light meal prep to heat up water to simulate making ramen noodles.  He completed mobility in the kitchen with no AD and using counter for balance.  He was able to retrieve the pan and transport between the sink and stove.  Pt was able to choose the correct burner and turned off the burner after heating the water.  Pt completed mobility with no LOB>     Functional Mobility  Device: Rolling walker  Activity: To/From therapy gym  Assistance Level: Stand by assist  Bed To/From Chair  Assistance Level: Stand by assist  Skilled Clinical Factors: Transfer to the recliner and wheelchair.   OT Exercises  Exercise Treatment: UE exercise with 3# weights chest press, overhead press, horizontal ab/adduction, elbow flexion/extension, supination/pronation x 10 reps     Assessment  Assessment  Assessment: Pt tolerated session well.  He completed mobility in the gym using RW with SBA.  He completed light

## 2024-11-11 NOTE — PROGRESS NOTES
Therapist was present, directed the patient's care, made skilled judgement, and was responsible for assessment and treatment of the patient.       Telma Santos, SPT, 11/11/24 at 3:59 PM   Electronically signed by LATASHA Sood on 11/11/24 at 11:42 AM EST     Electronically signed by ARMANDO PARR PT on 11/11/24 at 4:09 PM EST

## 2024-11-11 NOTE — PROGRESS NOTES
Patient admitted to rehab with CVA  A/Ox4. Transfers with walker, less impulsive. On cardiac diet, tolerating well, reviewed diabetic needs. Medications taken whole. On Lovenox for DVT prophylaxis.  Skin: kathryn area healing. Has been continent of bladder. LBM 10/11/2024.. Chair/bed alarms in use and call light in reach. Will monitor for safety.

## 2024-11-11 NOTE — PLAN OF CARE
comorbid symptoms for stability, deterioration, or improvement   Collaborate with multidisciplinary team to address chronic and comorbid conditions and prevent exacerbation or deterioration   Update acute care plan with appropriate goals if chronic or comorbid symptoms are exacerbated and prevent overall improvement and discharge  11/11/2024 0200 by Lina Carlson RN  Outcome: Progressing     Problem: Safety - Adult  Goal: Free from fall injury  11/11/2024 0948 by Magnolia Adhikari RN  Outcome: Progressing  Flowsheets (Taken 11/11/2024 0938)  Free From Fall Injury: Instruct family/caregiver on patient safety  11/11/2024 0200 by Lina Carlson RN  Outcome: Progressing     Problem: Pain  Goal: Verbalizes/displays adequate comfort level or baseline comfort level  11/11/2024 0948 by Magnolia Adhikari RN  Outcome: Progressing  Flowsheets (Taken 11/11/2024 0712)  Verbalizes/displays adequate comfort level or baseline comfort level: Encourage patient to monitor pain and request assistance  11/11/2024 0200 by Lina Carlson RN  Outcome: Progressing     Problem: Nutrition Deficit:  Goal: Optimize nutritional status  11/11/2024 0948 by Magnolia Adhikari RN  Outcome: Progressing  11/11/2024 0200 by Lina Carlson RN  Outcome: Progressing     Problem: ABCDS Injury Assessment  Goal: Absence of physical injury  11/11/2024 0948 by Magnolia Adhikari RN  Outcome: Progressing  Flowsheets (Taken 11/11/2024 0938)  Absence of Physical Injury: Implement safety measures based on patient assessment  11/11/2024 0200 by Lina Carlson RN  Outcome: Progressing     Problem: Neurosensory - Adult  Goal: Achieves stable or improved neurological status  11/11/2024 0948 by Magnolia Adhikari RN  Outcome: Progressing  11/11/2024 0200 by Lina Carlson RN  Outcome: Progressing     Problem: Respiratory - Adult  Goal: Achieves optimal ventilation and oxygenation  11/11/2024 0948 by Magnolia Adhikari RN  Outcome:

## 2024-11-12 LAB
GLUCOSE BLD-MCNC: 110 MG/DL (ref 70–99)
GLUCOSE BLD-MCNC: 184 MG/DL (ref 70–99)
PERFORMED ON: ABNORMAL
PERFORMED ON: ABNORMAL

## 2024-11-12 PROCEDURE — 1280000000 HC REHAB R&B

## 2024-11-12 PROCEDURE — 97116 GAIT TRAINING THERAPY: CPT | Performed by: PHYSICAL THERAPIST

## 2024-11-12 PROCEDURE — 97530 THERAPEUTIC ACTIVITIES: CPT

## 2024-11-12 PROCEDURE — 94640 AIRWAY INHALATION TREATMENT: CPT

## 2024-11-12 PROCEDURE — 6370000000 HC RX 637 (ALT 250 FOR IP): Performed by: PHYSICAL MEDICINE & REHABILITATION

## 2024-11-12 PROCEDURE — 97530 THERAPEUTIC ACTIVITIES: CPT | Performed by: PHYSICAL THERAPIST

## 2024-11-12 PROCEDURE — 94760 N-INVAS EAR/PLS OXIMETRY 1: CPT

## 2024-11-12 PROCEDURE — 97535 SELF CARE MNGMENT TRAINING: CPT

## 2024-11-12 PROCEDURE — 97110 THERAPEUTIC EXERCISES: CPT | Performed by: PHYSICAL THERAPIST

## 2024-11-12 PROCEDURE — 6360000002 HC RX W HCPCS: Performed by: PHYSICAL MEDICINE & REHABILITATION

## 2024-11-12 RX ORDER — CLOPIDOGREL BISULFATE 75 MG/1
75 TABLET ORAL DAILY
Qty: 7 TABLET | Refills: 0 | Status: SHIPPED | OUTPATIENT
Start: 2024-11-14 | End: 2024-11-21

## 2024-11-12 RX ADMIN — ENOXAPARIN SODIUM 40 MG: 100 INJECTION SUBCUTANEOUS at 07:08

## 2024-11-12 RX ADMIN — CLOPIDOGREL BISULFATE 75 MG: 75 TABLET ORAL at 07:09

## 2024-11-12 RX ADMIN — METFORMIN HYDROCHLORIDE 500 MG: 500 TABLET, EXTENDED RELEASE ORAL at 07:09

## 2024-11-12 RX ADMIN — Medication 400 MG: at 07:09

## 2024-11-12 RX ADMIN — ASPIRIN 81 MG 81 MG: 81 TABLET ORAL at 07:09

## 2024-11-12 RX ADMIN — BUDESONIDE AND FORMOTEROL FUMARATE DIHYDRATE 2 PUFF: 160; 4.5 AEROSOL RESPIRATORY (INHALATION) at 09:31

## 2024-11-12 RX ADMIN — PANTOPRAZOLE SODIUM 40 MG: 40 TABLET, DELAYED RELEASE ORAL at 05:45

## 2024-11-12 RX ADMIN — LOSARTAN POTASSIUM 100 MG: 100 TABLET, FILM COATED ORAL at 07:09

## 2024-11-12 RX ADMIN — ATORVASTATIN CALCIUM 10 MG: 10 TABLET, FILM COATED ORAL at 07:09

## 2024-11-12 RX ADMIN — ANTI-FUNGAL POWDER MICONAZOLE NITRATE TALC FREE: 1.42 POWDER TOPICAL at 07:10

## 2024-11-12 RX ADMIN — Medication 400 MG: at 20:24

## 2024-11-12 RX ADMIN — HYDROCHLOROTHIAZIDE 25 MG: 25 TABLET ORAL at 07:09

## 2024-11-12 RX ADMIN — BUDESONIDE AND FORMOTEROL FUMARATE DIHYDRATE 2 PUFF: 160; 4.5 AEROSOL RESPIRATORY (INHALATION) at 19:19

## 2024-11-12 ASSESSMENT — PAIN SCALES - GENERAL
PAINLEVEL_OUTOF10: 0
PAINLEVEL_OUTOF10: 0

## 2024-11-12 ASSESSMENT — 9 HOLE PEG TEST: TESTTIME_SECONDS: 46

## 2024-11-12 ASSESSMENT — PAIN SCALES - WONG BAKER: WONGBAKER_NUMERICALRESPONSE: NO HURT

## 2024-11-12 NOTE — PROGRESS NOTES
Continue home PPI     Occupational Therapy  Facility/Department: [address] REHAB  Rehabilitation Occupational Therapy Daily AM and PM Treatment Note  Discharge Summary    Date: 24  Patient Name: Sean Jackson       Room: X9T-7550/3270-01  MRN: [id number]  Account: [id number]   : 1955  (69 y.o.) Gender: male         Past Medical History:  has a past medical history of Asthma, GERD (gastroesophageal reflux disease), Hyperlipidemia, and Hypertension.  Past Surgical History:   has a past surgical history that includes eye muscle surgery; Wrist surgery; and Appendectomy.    Restrictions  Restrictions/Precautions: Fall Risk  Other position/activity restrictions: JANI stockings    Subjective  Subjective: Pt met in room, reports no pain and tired although he slept well Pt agreeable to OT ADL.  Restrictions/Precautions: Fall Risk             Objective     Cognition  Overall Cognitive Status: Exceptions  Safety Judgement: Decreased awareness of need for assistance;Decreased awareness of need for safety  Orientation  Overall Orientation Status: Within Functional Limits         ADL  Grooming/Oral Hygiene  Assistance Level: Modified independent  Skilled Clinical Factors: Pt in stance at sink to brush teeth. Seated in recliner he combed hair.  Upper Extremity Bathing  Assistance Level: Modified independent  Skilled Clinical Factors: Seated on shower chair for upper body bathing, able to manage water on/off and [facility]  Lower Extremity Bathing  Assistance Level: Modified independent  Skilled Clinical Factors: Pt crossed his legs to wash his feet, in stance he washes buttocks Mod I.  Upper Extremity Dressing  Assistance Level: Independent  Skilled Clinical Factors: Pt gathered clothing from drawer, placed on RW and transport to bathroom. In stance at shower he doffed t shirt and dons seated.  Lower Extremity Dressing  Assistance Level: Modified independent  Skilled Clinical Factors: Pt managed pants and underwear from hips in stance and

## 2024-11-12 NOTE — PROGRESS NOTES
Time Out 0900         Minutes 45           Timed Code Treatment Minutes: 45 Minutes    Second Session Therapy Time     Individual Co-treatment   Time In 1315     Time Out 1400     Minutes 45            Therapist was present, directed the patient's care, made skilled judgement, and was responsible for assessment and treatment of the patient.       Telma Santos, SPT, 11/12/24 at 4:06 PM  Electronically signed by Telma Santos, SPT on 11/12/24 at 4:14 PM EST      Electronically signed by ARMANDO PARR PT on 11/12/24 at 4:27 PM EST

## 2024-11-12 NOTE — PROGRESS NOTES
This is a 69 y.o.  male admitted on 10/31/2024 with Acute ischemic stroke (HCC) [I63.9].  A&O X 4. Active bowel sounds. Last BM 11/10/2024. Patient is continent of bowel & bladder. He uses the urinal. He is on cardiac diet. Medications taken whole with thins. He is on Lovenox for DVT prophylaxis. Skin: psoriasis patches scattered. Transfers with walker. HS medication given. Tolerated well. Call light and bedside table within reach. Patient instructed to call if he needs anything.

## 2024-11-12 NOTE — PLAN OF CARE
Problem: Chronic Conditions and Co-morbidities  Goal: Patient's chronic conditions and co-morbidity symptoms are monitored and maintained or improved  Outcome: Progressing  Problem: Pain  Goal: Verbalizes/displays adequate comfort level or baseline comfort level  11/12/2024 0946 by Anselmo Chambers RN  Verbalizes/displays adequate comfort level or baseline comfort level: Encourage patient to monitor pain and request assistance     Problem: Discharge Planning  Goal: Discharge to home or other facility with appropriate resources  Recent Flowsheet Documentation  Taken 11/12/2024 0715 by Anselmo Chambers RN  Discharge to home or other facility with appropriate resources: Identify barriers to discharge with patient and caregiver     Problem: ABCDS Injury Assessment  Goal: Absence of physical injury  Recent Flowsheet Documentation  Taken 11/12/2024 0714 by Anselmo Chambers RN  Absence of Physical Injury: Implement safety measures based on patient assessment     Problem: Neurosensory - Adult  Goal: Achieves stable or improved neurological status  Recent Flowsheet Documentation  Taken 11/12/2024 0715 by Anselmo Chambers RN  Achieves stable or improved neurological status: Assess for and report changes in neurological status     Problem: Respiratory - Adult  Goal: Achieves optimal ventilation and oxygenation  Recent Flowsheet Documentation  Taken 11/12/2024 0715 by Anselmo Chambers RN  Achieves optimal ventilation and oxygenation: Assess for changes in respiratory status      no

## 2024-11-12 NOTE — PROGRESS NOTES
Department of Physical Medicine & Rehabilitation  Progress Note    Patient Identification:  Sean Jackson  [id number]  : 1955  Admit date: 10/31/2024    Chief Complaint: Acute ischemic stroke (HCC)    Subjective:   No acute events overnight.   Patient seen this afternoon sitting up in room. He  reports feeling well and looking forward to discharge home tomorrow. We discussed plans for medications and follow-ups. He is refusing home care and outpatient therapies. He feels his LLE strength is improving steadily. I provided education on the importance of ongoing therapies in the setting of stroke recovery. He verbalizes understanding but would like to do home exercise program.  He will ask primary care doctor for outpatient therapy prescription if needed.  Labs reviewed.     ROS: No f/c, n/v, cp     Objective:  Patient Vitals for the past 24 hrs:   BP Temp Temp src Pulse Resp SpO2 Weight   24 0931 -- -- -- -- -- 93 % --   24 0707 (!) 143/87 97.7 °F (36.5 °C) Oral 71 16 92 % --   24 0545 -- -- -- -- -- -- 87 kg (191 lb 12.8 oz)   24 204 (!) 144/84 97.7 °F (36.5 °C) Oral 74 16 94 % --   24 1945 -- -- -- -- -- 93 % --     Const: Alert. No distress, pleasant.   HEENT: Normocephalic, atraumatic. Normal sclera/conjunctiva. MMM.   CV: Regular rate and rhythm.   Resp: No respiratory distress. Lungs CTAB.   Abd: Soft, nontender, nondistended, NABS+   Ext: No edema.   Neuro: Alert, oriented, appropriately interactive. Left hemiparesis (LUE 4+/5, LLE 4/5 except 1/5 left ankle DF and 2-3/5 PF). Decreased coordination left hemibody  Psych: Cooperative, appropriate mood and affect    Laboratory data: Available via EMR.   Last 24 hour lab  Recent Results (from the past 24 hour(s))   POCT Glucose    Collection Time: 24  7:03 AM   Result Value Ref Range    POC Glucose 110 (H) 70 - 99 mg/dl    Performed on ACCU-CHEK        Therapy progress:  Physical therapy:  Bed Mobility:  Overall

## 2024-11-13 VITALS
HEIGHT: 66 IN | SYSTOLIC BLOOD PRESSURE: 143 MMHG | WEIGHT: 193.12 LBS | DIASTOLIC BLOOD PRESSURE: 77 MMHG | OXYGEN SATURATION: 96 % | HEART RATE: 87 BPM | RESPIRATION RATE: 18 BRPM | BODY MASS INDEX: 31.04 KG/M2 | TEMPERATURE: 97.6 F

## 2024-11-13 LAB
GLUCOSE BLD-MCNC: 107 MG/DL (ref 70–99)
PERFORMED ON: ABNORMAL

## 2024-11-13 PROCEDURE — 6370000000 HC RX 637 (ALT 250 FOR IP): Performed by: PHYSICAL MEDICINE & REHABILITATION

## 2024-11-13 PROCEDURE — 94640 AIRWAY INHALATION TREATMENT: CPT

## 2024-11-13 PROCEDURE — 6360000002 HC RX W HCPCS: Performed by: PHYSICAL MEDICINE & REHABILITATION

## 2024-11-13 PROCEDURE — 94760 N-INVAS EAR/PLS OXIMETRY 1: CPT

## 2024-11-13 RX ADMIN — BUDESONIDE AND FORMOTEROL FUMARATE DIHYDRATE 2 PUFF: 160; 4.5 AEROSOL RESPIRATORY (INHALATION) at 07:51

## 2024-11-13 RX ADMIN — ENOXAPARIN SODIUM 40 MG: 100 INJECTION SUBCUTANEOUS at 08:39

## 2024-11-13 RX ADMIN — CLOPIDOGREL BISULFATE 75 MG: 75 TABLET ORAL at 08:37

## 2024-11-13 RX ADMIN — ATORVASTATIN CALCIUM 10 MG: 10 TABLET, FILM COATED ORAL at 08:37

## 2024-11-13 RX ADMIN — ASPIRIN 81 MG 81 MG: 81 TABLET ORAL at 08:38

## 2024-11-13 RX ADMIN — Medication 400 MG: at 08:38

## 2024-11-13 RX ADMIN — LOSARTAN POTASSIUM 100 MG: 100 TABLET, FILM COATED ORAL at 08:37

## 2024-11-13 RX ADMIN — HYDROCHLOROTHIAZIDE 25 MG: 25 TABLET ORAL at 08:37

## 2024-11-13 RX ADMIN — PANTOPRAZOLE SODIUM 40 MG: 40 TABLET, DELAYED RELEASE ORAL at 05:49

## 2024-11-13 RX ADMIN — METFORMIN HYDROCHLORIDE 500 MG: 500 TABLET, EXTENDED RELEASE ORAL at 08:37

## 2024-11-13 ASSESSMENT — PAIN SCALES - GENERAL: PAINLEVEL_OUTOF10: 0

## 2024-11-13 ASSESSMENT — PAIN SCALES - WONG BAKER: WONGBAKER_NUMERICALRESPONSE: NO HURT

## 2024-11-13 NOTE — PROGRESS NOTES
Respiratory Therapies   C1. Oxygen Therapy []           C2. Continuous []           C3. Intermittent []           C4. High-concentration []   D1. Suctioning []           D2. Scheduled []           D3. As needed []   E1. Tracheostomy Care []   F1. Invasive Mechanical Ventilator (ventilator or respirator) []   G1. Non-invasive Mechanical Ventilator []           G2. BiPAP []           G3. CPAP []   Other   H1. IV Medications []           H2. Vasoactive medications []           H3. Antibiotics []           H4. Anticoagulation []           H10. Other []   I1. Transfusions []   J1. Dialysis []           J2. Hemodialysis []           J3. Peritoneal dialysis []   O1. IV access []           O2. Peripheral []           O3. Midline []           O4. Central (PICC, tunneled, port) []      None of the above (select if no Cancer, Respiratory, or Other boxes are checked) [x]

## 2024-11-13 NOTE — PLAN OF CARE
Problem: Discharge Planning  Goal: Discharge to home or other facility with appropriate resources  Outcome: Progressing  Flowsheets (Taken 11/12/2024 2030)  Discharge to home or other facility with appropriate resources: Identify barriers to discharge with patient and caregiver     Problem: Chronic Conditions and Co-morbidities  Goal: Patient's chronic conditions and co-morbidity symptoms are monitored and maintained or improved  Outcome: Progressing  Flowsheets (Taken 11/12/2024 2030)  Care Plan - Patient's Chronic Conditions and Co-Morbidity Symptoms are Monitored and Maintained or Improved: Monitor and assess patient's chronic conditions and comorbid symptoms for stability, deterioration, or improvement     Problem: Skin/Tissue Integrity  Goal: Absence of new skin breakdown  Description: 1.  Monitor for areas of redness and/or skin breakdown  2.  Assess vascular access sites hourly  3.  Every 4-6 hours minimum:  Change oxygen saturation probe site  4.  Every 4-6 hours:  If on nasal continuous positive airway pressure, respiratory therapy assess nares and determine need for appliance change or resting period.  Outcome: Progressing     Problem: Safety - Adult  Goal: Free from fall injury  11/12/2024 2138 by Magnolia Watkins, RN  Outcome: Progressing     Problem: Pain  Goal: Verbalizes/displays adequate comfort level or baseline comfort level  11/12/2024 2138 by Magnolia Watkins, RN  Outcome: Progressing     Problem: ABCDS Injury Assessment  Goal: Absence of physical injury  Outcome: Progressing  Flowsheets (Taken 11/12/2024 2135)  Absence of Physical Injury: Implement safety measures based on patient assessment     Problem: Respiratory - Adult  Goal: Achieves optimal ventilation and oxygenation  Outcome: Progressing     Problem: Skin/Tissue Integrity - Adult  Goal: Skin integrity remains intact  Outcome: Progressing  Flowsheets  Taken 11/12/2024 2135  Skin Integrity Remains Intact: Monitor for areas of

## 2024-11-13 NOTE — PROGRESS NOTES
Patient admitted to rehab with CVA.  A/Ox4. Transfers with walker x1. Mobility restrictions: WBAT. On Cardiac diet, tolerating well. Medications taken whole with thins. On Plavix, Lovenox for DVT prophylaxis.  Skin: scattered patches of psoriasis, redness to groin, healing tear to gluteal slit. Oxygen: RA. LDA: none. Has been continent of bowel and of bladder. LBM 11-13. Chair/bed alarms in use and call light in reach. Will monitor for safety.

## 2024-11-13 NOTE — PROGRESS NOTES
CLINICAL PHARMACY NOTE: MEDS TO BEDS    Total # of Prescriptions Filled: 1   The following medications were delivered to the patient:  Current Discharge Medication List        Clopidogrel 75mg    Additional Documentation:

## 2024-11-13 NOTE — PLAN OF CARE
Problem: Discharge Planning  Goal: Discharge to home or other facility with appropriate resources  11/13/2024 1102 by Josette Russell RN  Outcome: Completed     Problem: Chronic Conditions and Co-morbidities  Goal: Patient's chronic conditions and co-morbidity symptoms are monitored and maintained or improved  11/13/2024 1102 by Josette Russell RN  Outcome: Completed     Problem: Skin/Tissue Integrity  Goal: Absence of new skin breakdown  Description: 1.  Monitor for areas of redness and/or skin breakdown  2.  Assess vascular access sites hourly  3.  Every 4-6 hours minimum:  Change oxygen saturation probe site  4.  Every 4-6 hours:  If on nasal continuous positive airway pressure, respiratory therapy assess nares and determine need for appliance change or resting period.  11/13/2024 1102 by Josette Russell RN  Outcome: Completed     Problem: Safety - Adult  Goal: Free from fall injury  11/13/2024 1102 by Josette Russell RN  Outcome: Completed     Problem: Pain  Goal: Verbalizes/displays adequate comfort level or baseline comfort level  11/13/2024 1102 by Josette Russell RN  Outcome: Completed     Problem: Nutrition Deficit:  Goal: Optimize nutritional status  11/13/2024 1102 by Josette Russell RN  Outcome: Completed     Problem: ABCDS Injury Assessment  Goal: Absence of physical injury  11/13/2024 1102 by Josette Russell RN  Outcome: Completed     Problem: Neurosensory - Adult  Goal: Achieves stable or improved neurological status  11/13/2024 1102 by Josette Russell RN  Outcome: Completed     Problem: Respiratory - Adult  Goal: Achieves optimal ventilation and oxygenation  11/13/2024 1102 by Josette Russell RN  Outcome: Completed     Problem: Skin/Tissue Integrity - Adult  Goal: Skin integrity remains intact  11/13/2024 1102 by Josette Russell RN  Outcome: Completed     Problem: Musculoskeletal - Adult  Goal: Return mobility to safest level of

## 2024-11-13 NOTE — PROGRESS NOTES
Patient agreed to discharge.     Patient and grandson in room for discharge instructions, questions answered with verbal acknowledgement received, papers signed and copies given to patient, prescriptions given to [facility] via [facility]. Staff transported patient via wheel chair to [facility] and discharged home with all documented belongings. Electronically signed by Josette Russell RN on 11/13/2024 at 11:18 AM

## 2024-11-13 NOTE — DISCHARGE SUMMARY
Physical Medicine & Rehabilitation  Discharge Summary     Patient Identification:  Sean Jackson  : 1955  Admit date: 10/31/2024  Discharge date:  2024  Attending provider: Beryl Greenberg MD        Primary care provider: No primary care provider on file.     Discharge Diagnoses:   Patient Active Problem List   Diagnosis    Acute ischemic stroke (HCC)       History of Present Illness/Acute Hospital Course:  Patient is a 69-year-old male who developed a sudden onset of left arm and leg weakness, ataxia, and dysarthria. Patient was brought to the hospital where MRI scan of the brain revealed a small acute infarction of the right thalamus. Patient known to have history of hypertension, diabetes type 2, COPD, and increased lipids. Patient seen by neurology and started on aspirin and Plavix.     Inpatient Rehabilitation Course:   Sean Jackson is a 69 y.o. male admitted to inpatient rehabilitation on 10/31/2024 with Acute ischemic stroke (HCC).  The patient participated in an aggressive multidisciplinary inpatient rehabilitation program involving 3 hours of therapy per day, at least 5 days per week. He made good progress with regard to left side strength, balance, functional mobility, compensatory strategies. Remains limited by distal LLE weakness but this is improving. Now overall modified independent for mobility and ADLs. Discharging home with roommate. Outpatient therapies recommended but patient has declined this. Patient will follow-up with PCP, Neurology.     Impairments: left hemiparesis, left coordination deficit, decreased balance, safety awareness    Medical Management:    Acute ischemic right thalamic stroke  -secondary ppx: DAPT x 21 days followed by ASA monotherapy, statin  -PT/OT/SLP  -Left air cast ordered due to ankle instability     HTN  -continue HCTZ+ losartan  -- trend overall controlled     HLD  -continue atorvastatin     DM2 with hyperglycemia  -Resumed home metformin --